# Patient Record
Sex: MALE | Race: WHITE | URBAN - METROPOLITAN AREA
[De-identification: names, ages, dates, MRNs, and addresses within clinical notes are randomized per-mention and may not be internally consistent; named-entity substitution may affect disease eponyms.]

---

## 2020-10-05 ENCOUNTER — TELEPHONE ENCOUNTER (OUTPATIENT)
Dept: URBAN - METROPOLITAN AREA CLINIC 82 | Facility: CLINIC | Age: 75
End: 2020-10-05

## 2020-12-08 ENCOUNTER — TELEPHONE ENCOUNTER (OUTPATIENT)
Dept: URBAN - METROPOLITAN AREA CLINIC 82 | Facility: CLINIC | Age: 75
End: 2020-12-08

## 2021-04-14 ENCOUNTER — LAB OUTSIDE AN ENCOUNTER (OUTPATIENT)
Dept: URBAN - METROPOLITAN AREA CLINIC 82 | Facility: CLINIC | Age: 76
End: 2021-04-14

## 2021-04-14 ENCOUNTER — OFFICE VISIT (OUTPATIENT)
Dept: URBAN - METROPOLITAN AREA CLINIC 82 | Facility: CLINIC | Age: 76
End: 2021-04-14
Payer: MEDICARE

## 2021-04-14 DIAGNOSIS — K22.70 BARRETT'S ESOPHAGUS: ICD-10-CM

## 2021-04-14 DIAGNOSIS — K58.9 IRRITABLE BOWEL SYNDROME: ICD-10-CM

## 2021-04-14 DIAGNOSIS — Z86.010 PERSONAL HISTORY OF COLON POLYPS: ICD-10-CM

## 2021-04-14 DIAGNOSIS — K21.9 GERD: ICD-10-CM

## 2021-04-14 PROCEDURE — G9903 PT SCRN TBCO ID AS NON USER: HCPCS | Performed by: INTERNAL MEDICINE

## 2021-04-14 PROCEDURE — G8420 CALC BMI NORM PARAMETERS: HCPCS | Performed by: INTERNAL MEDICINE

## 2021-04-14 PROCEDURE — G8427 DOCREV CUR MEDS BY ELIG CLIN: HCPCS | Performed by: INTERNAL MEDICINE

## 2021-04-14 PROCEDURE — 99214 OFFICE O/P EST MOD 30 MIN: CPT | Performed by: INTERNAL MEDICINE

## 2021-04-14 RX ORDER — ASPIRIN 81 MG/1
1 TABLET TABLET, COATED ORAL ONCE A DAY
Status: ACTIVE | COMMUNITY

## 2021-04-14 RX ORDER — ASPIRIN 81 MG/1
TAKE 1 TABLET (81 MG) BY ORAL ROUTE ONCE DAILY TABLET, COATED ORAL 1
Qty: 0 | Refills: 0 | Status: ACTIVE | COMMUNITY
Start: 1900-01-01

## 2021-04-14 RX ORDER — ATENOLOL 25 MG/1
TABLET ORAL
Qty: 0 | Refills: 0 | Status: ACTIVE | COMMUNITY
Start: 1900-01-01

## 2021-04-14 NOTE — HPI-TODAY'S VISIT:
04/14/2021 Patient presents for a follow up office visit. 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 Colonoscopy in 09/2018 showed polyps in the rectum. Patient c/o intermittent bloating, but now he can pass the gas unlike before. He has stopped eating late at night, and his bowel movements are normal. Changing his diet and eating earlier has greatly improved his symptoms. His heartburn has improved. His stomach stometimes feels "uneasy" but he takes a Tums when that happens. He still takes omeprazole twice a day but has not used dicyclomine for a while. He does not notice much difference when he did use dicyclomine. Denies any issues with dysphagia. Patient is scheduled to see Dr. Corona next month for a physcial. His last blood work showed vitamin D and B12 levels were normal.

## 2021-04-14 NOTE — HPI-OTHER HISTORIES
The patient is a 74 year old /White male, who presents for the follow-up evaluation of GERD.  Since the last visit the patient is doing well. There are no new complaints. The present regimen is being followed. The patient has had an EGD. The EGD showed the findings that are listed in the data base of the chart. I reviewed these with the patient. and Michele's esophagus. The biopsies showed no dysplasia. 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 He also underwent recently ablation of Michele's esophagus. No dysplasia noted durong EGD. he came for post ablation of Michele's esophagus follow up. Symptoms of bloating and dyspepsia improved. Denies of dysphagia . no blaoting, no GI bleeding. he feel better, no dysophagia. no nauea. no GI bleeding 7/17/16 says he hurt his back recenly. no heartburn , no gerd. no dysaphgia. no loss of weight.     12/13/2016 Patient complains of some heartburn. Denies acid reflux. He stopped taking Bentyl because he felt as if it didn't help him. Patient is currently on Omeprazole therapy bid. He takes vitamin D and B12. He admits occasional bloating. Last EGD on 12/14/15 looked okay. No GI bleeding. No dysphagia.  07/20/2017 Labwork done by Dr. Corona on 12/13/16 were normal. EGD done on 06/20/17 showed gastritis and small tongue like projection suggestive of Michele's esophagus on the biopsies without dysplasia. Small umbilical hernia noted. Patient says he is doing better. Bloating improved. He is currently on Omeprazole. He has been following up with a nephrologist.  1/18/2018 Patient denies heartburn and acid reflux. Denies dysphagia and bloating. TUMS sometimes provides him relief. Still taking omeprazole. Denies constipation and abdominal pain. Previous endoscopy did show Michele's esophagus and single tongue-like projection.  1/11/19 Denies any new issues with hemorrhoids. Patient had an episode with sickness where he had an ear infection and frequent ear popping, after going to specialist, he was placed on Furoxime. States he does not feel he has complete evacuation when he has bowel movement. Denies constipation. Admits to occasional bloating. Denies rectal bleeding from hemorrhoids, never had surgery done.  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 Biopsy of the esophagus showed focal Michele's esophagus with no dysplasia. Patient states he still has occasional bloating, but he admits to changing his diet by adding more vegetables and some fruits. He has also lost weight. He has started using [employer] colon health probiotics but he did not see any imporvement. He is still taking Omeprazole. He states he had sinus surgery which made a difference, since he can now breath through his nose. He had a lot of drainage, which is resolving now after the surgery. He states Bentyl did not help his bloating symptoms. He denies any melena or hematochezia.  10/18/2019 Patient states symptoms are better. He states he has been on omeprazole. Denies any dysphagia. He will see Dr. Corona this upcoming Tuesday. He has not taken Levsin, he states he took it for a while and did not notice any improvement. He has changed his diet. Still has occasional bloating. He also has been on probiotic therapy. Overall, symptoms are under control. Denies any abdominal pain, melena or hematochezia. Colonoscopy on 9/21/2018 showed polyps.  04/08/2020 I called the patient as he was unable to join for video call on [employer]. He attempted to download [employer] and it was not supported, proceeded with telephone visit. He states bloating has improved, but he does report upset stomach, normally after taking the omeprazole. He reports making changes to his diet. He states the discomfort, is not every day. Denies any dysphagia. He states he feels pretty good overall. He has not had any symptoms of heartburn. He states that during his morning bowel movement, he feels incomplete evacuation. He has to have another bowel movement after a few hours.

## 2021-04-16 ENCOUNTER — TELEPHONE ENCOUNTER (OUTPATIENT)
Dept: URBAN - METROPOLITAN AREA CLINIC 82 | Facility: CLINIC | Age: 76
End: 2021-04-16

## 2021-04-16 RX ORDER — OMEPRAZOLE 40 MG/1
1 CAPSULE 30 MINUTES BEFORE MORNING MEAL CAPSULE, DELAYED RELEASE PELLETS ORAL TWICE A DAY
Qty: 180 | Refills: 2

## 2021-04-19 ENCOUNTER — OFFICE VISIT (OUTPATIENT)
Dept: URBAN - METROPOLITAN AREA SURGERY CENTER 13 | Facility: SURGERY CENTER | Age: 76
End: 2021-04-19
Payer: MEDICARE

## 2021-04-19 ENCOUNTER — CLAIMS CREATED FROM THE CLAIM WINDOW (OUTPATIENT)
Dept: URBAN - METROPOLITAN AREA CLINIC 4 | Facility: CLINIC | Age: 76
End: 2021-04-19
Payer: MEDICARE

## 2021-04-19 DIAGNOSIS — R10.13 ABDOMINAL DISCOMFORT, EPIGASTRIC: ICD-10-CM

## 2021-04-19 DIAGNOSIS — K31.89 MASS OF DUODENUM: ICD-10-CM

## 2021-04-19 DIAGNOSIS — K21.9 GASTRO-ESOPHAGEAL REFLUX DISEASE WITHOUT ESOPHAGITIS: ICD-10-CM

## 2021-04-19 DIAGNOSIS — K31.89 ACQUIRED DEFORMITY OF DUODENUM: ICD-10-CM

## 2021-04-19 DIAGNOSIS — K22.70 BARRETT ESOPHAGUS: ICD-10-CM

## 2021-04-19 DIAGNOSIS — K21.9 ACID REFLUX: ICD-10-CM

## 2021-04-19 PROCEDURE — 88312 SPECIAL STAINS GROUP 1: CPT | Performed by: PATHOLOGY

## 2021-04-19 PROCEDURE — 43239 EGD BIOPSY SINGLE/MULTIPLE: CPT | Performed by: INTERNAL MEDICINE

## 2021-04-19 PROCEDURE — G8907 PT DOC NO EVENTS ON DISCHARG: HCPCS | Performed by: INTERNAL MEDICINE

## 2021-04-19 PROCEDURE — 88305 TISSUE EXAM BY PATHOLOGIST: CPT | Performed by: PATHOLOGY

## 2021-05-04 ENCOUNTER — OFFICE VISIT (OUTPATIENT)
Dept: URBAN - METROPOLITAN AREA CLINIC 82 | Facility: CLINIC | Age: 76
End: 2021-05-04
Payer: MEDICARE

## 2021-05-04 DIAGNOSIS — Z86.010 PERSONAL HISTORY OF COLON POLYPS: ICD-10-CM

## 2021-05-04 DIAGNOSIS — K58.9 IRRITABLE BOWEL SYNDROME: ICD-10-CM

## 2021-05-04 DIAGNOSIS — K22.70 BARRETT'S ESOPHAGUS: ICD-10-CM

## 2021-05-04 DIAGNOSIS — K21.9 GERD: ICD-10-CM

## 2021-05-04 PROCEDURE — G8420 CALC BMI NORM PARAMETERS: HCPCS | Performed by: INTERNAL MEDICINE

## 2021-05-04 PROCEDURE — G8427 DOCREV CUR MEDS BY ELIG CLIN: HCPCS | Performed by: INTERNAL MEDICINE

## 2021-05-04 PROCEDURE — G9903 PT SCRN TBCO ID AS NON USER: HCPCS | Performed by: INTERNAL MEDICINE

## 2021-05-04 PROCEDURE — 99214 OFFICE O/P EST MOD 30 MIN: CPT | Performed by: INTERNAL MEDICINE

## 2021-05-04 RX ORDER — ASPIRIN 81 MG/1
1 TABLET TABLET, COATED ORAL ONCE A DAY
Status: ACTIVE | COMMUNITY

## 2021-05-04 RX ORDER — ATENOLOL 25 MG/1
TABLET ORAL
Qty: 0 | Refills: 0 | Status: ACTIVE | COMMUNITY
Start: 1900-01-01

## 2021-05-04 RX ORDER — OMEPRAZOLE 40 MG/1
1 CAPSULE 30 MINUTES BEFORE MORNING MEAL CAPSULE, DELAYED RELEASE PELLETS ORAL TWICE A DAY
Qty: 180 | Refills: 2 | Status: ACTIVE | COMMUNITY

## 2021-05-04 RX ORDER — FAMOTIDINE 40 MG/1
1 TABLET TABLET, FILM COATED ORAL TWICE A DAY
Qty: 180 TABLET | OUTPATIENT
Start: 2021-05-04

## 2021-05-04 RX ORDER — ASPIRIN 81 MG/1
TAKE 1 TABLET (81 MG) BY ORAL ROUTE ONCE DAILY TABLET, COATED ORAL 1
Qty: 0 | Refills: 0 | Status: ACTIVE | COMMUNITY
Start: 1900-01-01

## 2021-05-04 NOTE — HPI-TODAY'S VISIT:
04/14/2021 Patient presents for a follow up office visit. 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 Colonoscopy in 09/2018 showed polyps in the rectum. Patient c/o intermittent bloating, but now he can pass the gas unlike before. He has stopped eating late at night, and his bowel movements are normal. Changing his diet and eating earlier has greatly improved his symptoms. His heartburn has improved. His stomach stometimes feels "uneasy" but he takes a Tums when that happens. He still takes omeprazole twice a day but has not used dicyclomine for a while. He does not notice much difference when he did use dicyclomine. Denies any issues with dysphagia. Patient is scheduled to see Dr. Corona next month for a physical. His last blood work showed vitamin D and B12 levels were normal.  05/04/2021 Patient presents for a follow up office visit. EGD on 04/19/2021 showed esophagitis and gastritis. Biopsy did not reveal any Michele's esophagus. Patient c/o occasional bloating and stomach pains. He used dicyclomine in the past without much effect. He denies heartburn or indigestion, mostly just "uneasy" stomach. He is still taking omeprazole twice a day. He does c/o occasional nocturnal symptoms, so he takes a Tums or Rolaid to soothe his stomach. Overall, he has been feeling well. He notes some hypertension which is being followed by his PCP Dr. Corona.

## 2021-05-04 NOTE — HPI-OTHER HISTORIES
The patient is a 74 year old /White male, who presents for the follow-up evaluation of GERD.  Since the last visit the patient is doing well. There are no new complaints. The present regimen is being followed. The patient has had an EGD. The EGD showed the findings that are listed in the data base of the chart. I reviewed these with the patient. and Michele's esophagus. The biopsies showed no dysplasia. 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 by Dr Gruber. He also underwent recently ablation of Michele's esophagus. No dysplasia noted durong EGD. he came for post ablation of Michele's esophagus follow up. Symptoms of bloating and dyspepsia improved. Denies of dysphagia . no blaoting, no GI bleeding. he feel better, no dysophagia. no nauea. no GI bleeding 7/17/16 says he hurt his back recenly. no heartburn , no gerd. no dysaphgia. no loss of weight.     12/13/2016 Patient complains of some heartburn. Denies acid reflux. He stopped taking Bentyl because he felt as if it didn't help him. Patient is currently on Omeprazole therapy bid. He takes vitamin D and B12. He admits occasional bloating. Last EGD on 12/14/15 looked okay. No GI bleeding. No dysphagia.  07/20/2017 Labwork done by Dr. Corona on 12/13/16 were normal. EGD done on 06/20/17 showed gastritis and small tongue like projection suggestive of Michele's esophagus on the biopsies without dysplasia. Small umbilical hernia noted. Patient says he is doing better. Bloating improved. He is currently on Omeprazole. He has been following up with a nephrologist.  1/18/2018 Patient denies heartburn and acid reflux. Denies dysphagia and bloating. TUMS sometimes provides him relief. Still taking omeprazole. Denies constipation and abdominal pain. Previous endoscopy did show Michele's esophagus and single tongue-like projection.  1/11/19 Denies any new issues with hemorrhoids. Patient had an episode with sickness where he had an ear infection and frequent ear popping, after going to specialist, he was placed on Furoxime. States he does not feel he has complete evacuation when he has bowel movement. Denies constipation. Admits to occasional bloating. Denies rectal bleeding from hemorrhoids, never had surgery done.  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 Biopsy of the esophagus showed focal Michele's esophagus with no dysplasia. Patient states he still has occasional bloating, but he admits to changing his diet by adding more vegetables and some fruits. He has also lost weight. He has started using [employer] colon health probiotics but he did not see any imporvement. He is still taking Omeprazole. He states he had sinus surgery which made a difference, since he can now breath through his nose. He had a lot of drainage, which is resolving now after the surgery. He states Bentyl did not help his bloating symptoms. He denies any melena or hematochezia.  10/18/2019 Patient states symptoms are better. He states he has been on omeprazole. Denies any dysphagia. He will see Dr. Corona this upcoming Tuesday. He has not taken Levsin, he states he took it for a while and did not notice any improvement. He has changed his diet. Still has occasional bloating. He also has been on probiotic therapy. Overall, symptoms are under control. Denies any abdominal pain, melena or hematochezia. Colonoscopy on 9/21/2018 showed polyps.  04/08/2020 I called the patient as he was unable to join for video call on [employer]. He attempted to download [employer] and it was not supported, proceeded with telephone visit. He states bloating has improved, but he does report upset stomach, normally after taking the omeprazole. He reports making changes to his diet. He states the discomfort, is not every day. Denies any dysphagia. He states he feels pretty good overall. He has not had any symptoms of heartburn. He states that during his morning bowel movement, he feels incomplete evacuation. He has to have another bowel movement after a few hours.

## 2021-05-05 ENCOUNTER — TELEPHONE ENCOUNTER (OUTPATIENT)
Dept: URBAN - METROPOLITAN AREA CLINIC 82 | Facility: CLINIC | Age: 76
End: 2021-05-05

## 2021-05-10 ENCOUNTER — TELEPHONE ENCOUNTER (OUTPATIENT)
Dept: URBAN - METROPOLITAN AREA CLINIC 82 | Facility: CLINIC | Age: 76
End: 2021-05-10

## 2021-11-02 ENCOUNTER — OFFICE VISIT (OUTPATIENT)
Dept: URBAN - METROPOLITAN AREA CLINIC 82 | Facility: CLINIC | Age: 76
End: 2021-11-02
Payer: MEDICARE

## 2021-11-02 DIAGNOSIS — Z86.010 PERSONAL HISTORY OF COLON POLYPS: ICD-10-CM

## 2021-11-02 DIAGNOSIS — K21.9 GERD: ICD-10-CM

## 2021-11-02 DIAGNOSIS — K58.9 IRRITABLE BOWEL SYNDROME: ICD-10-CM

## 2021-11-02 DIAGNOSIS — K22.70 BARRETT'S ESOPHAGUS: ICD-10-CM

## 2021-11-02 PROCEDURE — G9903 PT SCRN TBCO ID AS NON USER: HCPCS | Performed by: INTERNAL MEDICINE

## 2021-11-02 PROCEDURE — G8420 CALC BMI NORM PARAMETERS: HCPCS | Performed by: INTERNAL MEDICINE

## 2021-11-02 PROCEDURE — 99214 OFFICE O/P EST MOD 30 MIN: CPT | Performed by: INTERNAL MEDICINE

## 2021-11-02 PROCEDURE — G8427 DOCREV CUR MEDS BY ELIG CLIN: HCPCS | Performed by: INTERNAL MEDICINE

## 2021-11-02 RX ORDER — ASPIRIN 81 MG/1
TAKE 1 TABLET (81 MG) BY ORAL ROUTE ONCE DAILY TABLET, COATED ORAL 1
Qty: 0 | Refills: 0 | Status: DISCONTINUED | COMMUNITY
Start: 1900-01-01

## 2021-11-02 RX ORDER — OMEPRAZOLE 40 MG/1
1 CAPSULE 30 MINUTES BEFORE MORNING MEAL CAPSULE, DELAYED RELEASE PELLETS ORAL TWICE A DAY
Qty: 180 | Refills: 2 | Status: ACTIVE | COMMUNITY

## 2021-11-02 RX ORDER — FAMOTIDINE 40 MG/1
1 TABLET TABLET, FILM COATED ORAL TWICE A DAY
Qty: 180 TABLET | Status: ACTIVE | COMMUNITY
Start: 2021-05-04

## 2021-11-02 RX ORDER — ATENOLOL 25 MG/1
TABLET ORAL
Qty: 0 | Refills: 0 | Status: ACTIVE | COMMUNITY
Start: 1900-01-01

## 2021-11-02 RX ORDER — ASPIRIN 81 MG/1
1 TABLET TABLET, COATED ORAL ONCE A DAY
Status: ACTIVE | COMMUNITY

## 2021-11-02 NOTE — HPI-OTHER HISTORIES
The patient is a 74 year old /White male, who presents for the follow-up evaluation of GERD.  Since the last visit the patient is doing well. There are no new complaints. The present regimen is being followed. The patient has had an EGD. The EGD showed the findings that are listed in the data base of the chart. I reviewed these with the patient. and Michele's esophagus. The biopsies showed no dysplasia. 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 He also underwent recently ablation of Michele's esophagus. No dysplasia noted durong EGD. he came for post ablation of Michele's esophagus follow up. Symptoms of bloating and dyspepsia improved. Denies of dysphagia . no blaoting, no GI bleeding. he feel better, no dysophagia. no nauea. no GI bleeding 7/17/16 says he hurt his back recenly. no heartburn , no gerd. no dysaphgia. no loss of weight.     12/13/2016 Patient complains of some heartburn. Denies acid reflux. He stopped taking Bentyl because he felt as if it didn't help him. Patient is currently on Omeprazole therapy bid. He takes vitamin D and B12. He admits occasional bloating. Last EGD on 12/14/15 looked okay. No GI bleeding. No dysphagia.  07/20/2017 Labwork done by Dr. Corona on 12/13/16 were normal. EGD done on 06/20/17 showed gastritis and small tongue like projection suggestive of Michele's esophagus on the biopsies without dysplasia. Small umbilical hernia noted. Patient says he is doing better. Bloating improved. He is currently on Omeprazole. He has been following up with a nephrologist.  1/18/2018 Patient denies heartburn and acid reflux. Denies dysphagia and bloating. TUMS sometimes provides him relief. Still taking omeprazole. Denies constipation and abdominal pain. Previous endoscopy did show Michele's esophagus and single tongue-like projection.  1/11/19 Denies any new issues with hemorrhoids. Patient had an episode with sickness where he had an ear infection and frequent ear popping, after going to specialist, he was placed on Furoxime. States he does not feel he has complete evacuation when he has bowel movement. Denies constipation. Admits to occasional bloating. Denies rectal bleeding from hemorrhoids, never had surgery done.  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 Biopsy of the esophagus showed focal Michele's esophagus with no dysplasia. Patient states he still has occasional bloating, but he admits to changing his diet by adding more vegetables and some fruits. He has also lost weight. He has started using [employer] colon health probiotics but he did not see any imporvement. He is still taking Omeprazole. He states he had sinus surgery which made a difference, since he can now breath through his nose. He had a lot of drainage, which is resolving now after the surgery. He states Bentyl did not help his bloating symptoms. He denies any melena or hematochezia.  10/18/2019 Patient states symptoms are better. He states he has been on omeprazole. Denies any dysphagia. He will see Dr. Corona this upcoming Tuesday. He has not taken Levsin, he states he took it for a while and did not notice any improvement. He has changed his diet. Still has occasional bloating. He also has been on probiotic therapy. Overall, symptoms are under control. Denies any abdominal pain, melena or hematochezia. Colonoscopy on 9/21/2018 showed polyps.  04/08/2020 I called the patient as he was unable to join for video call on [employer]. He attempted to download [employer] and it was not supported, proceeded with telephone visit. He states bloating has improved, but he does report upset stomach, normally after taking the omeprazole. He reports making changes to his diet. He states the discomfort, is not every day. Denies any dysphagia. He states he feels pretty good overall. He has not had any symptoms of heartburn. He states that during his morning bowel movement, he feels incomplete evacuation. He has to have another bowel movement after a few hours.
Warm/Dry

## 2021-11-02 NOTE — HPI-TODAY'S VISIT:
04/14/2021 Patient presents for a follow up office visit. 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 Colonoscopy in 09/2018 showed polyps in the rectum. Patient c/o intermittent bloating, but now he can pass the gas unlike before. He has stopped eating late at night, and his bowel movements are normal. Changing his diet and eating earlier has greatly improved his symptoms. His heartburn has improved. His stomach stometimes feels "uneasy" but he takes a Tums when that happens. He still takes omeprazole twice a day but has not used dicyclomine for a while. He does not notice much difference when he did use dicyclomine. Denies any issues with dysphagia. Patient is scheduled to see Dr. Corona next month for a physical. His last blood work showed vitamin D and B12 levels were normal.  05/04/2021 Patient presents for a follow up office visit. EGD on 04/19/2021 showed esophagitis and gastritis. Biopsy did not reveal any Michele's esophagus. Patient c/o occasional bloating and stomach pains. He used dicyclomine in the past without much effect. He denies heartburn or indigestion, mostly just "uneasy" stomach. He is still taking omeprazole twice a day. He does c/o occasional nocturnal symptoms, so he takes a Tums or Rolaid to soothe his stomach. Overall, he has been feeling well. He notes some hypertension which is being followed by his PCP Dr. Corona.   11/2/2021 Patient presents for a follow up office visit. EGD on 4/19/2021 showed esophagitis and gastritis. No Michele's esoophagus and no H pylori noted on biopsy. Patient reports a recurrence of mild heartburn after switching from Omeprazole to Famotidine in the evenings. He uses Tums to manage nocturnal symptoms. He denies any bloating or gas pains. Denies any dysphagia. He began drinking Pepsi Freeze drinks which help him pass gas. His bowel movements are overall regular. Symptoms have also improved with weight loss. He denies any changes to medical history. Patient takes vitamin B12 and D supplements daily.

## 2021-11-17 ENCOUNTER — TELEPHONE ENCOUNTER (OUTPATIENT)
Dept: URBAN - METROPOLITAN AREA CLINIC 82 | Facility: CLINIC | Age: 76
End: 2021-11-17

## 2021-11-17 RX ORDER — FAMOTIDINE 40 MG/1
1 TABLET TABLET, FILM COATED ORAL TWICE A DAY
Qty: 180 TABLET | Refills: 1 | OUTPATIENT
Start: 2021-11-17

## 2022-02-16 ENCOUNTER — OFFICE VISIT (OUTPATIENT)
Dept: URBAN - METROPOLITAN AREA CLINIC 82 | Facility: CLINIC | Age: 77
End: 2022-02-16

## 2022-02-16 RX ORDER — ASPIRIN 81 MG/1
1 TABLET TABLET, COATED ORAL ONCE A DAY
Status: ACTIVE | COMMUNITY

## 2022-02-16 RX ORDER — FAMOTIDINE 40 MG/1
1 TABLET TABLET, FILM COATED ORAL TWICE A DAY
Qty: 180 TABLET | Refills: 1 | Status: ACTIVE | COMMUNITY
Start: 2021-11-17

## 2022-02-16 RX ORDER — ATENOLOL 25 MG/1
TABLET ORAL
Qty: 0 | Refills: 0 | Status: ACTIVE | COMMUNITY
Start: 1900-01-01

## 2022-02-16 RX ORDER — OMEPRAZOLE 40 MG/1
1 CAPSULE 30 MINUTES BEFORE MORNING MEAL CAPSULE, DELAYED RELEASE PELLETS ORAL TWICE A DAY
Qty: 180 | Refills: 2 | Status: ACTIVE | COMMUNITY

## 2022-02-16 RX ORDER — FAMOTIDINE 40 MG/1
1 TABLET TABLET, FILM COATED ORAL TWICE A DAY
Qty: 180 TABLET | Status: ACTIVE | COMMUNITY
Start: 2021-05-04

## 2022-02-18 ENCOUNTER — LAB OUTSIDE AN ENCOUNTER (OUTPATIENT)
Dept: URBAN - METROPOLITAN AREA CLINIC 82 | Facility: CLINIC | Age: 77
End: 2022-02-18

## 2022-02-18 ENCOUNTER — OFFICE VISIT (OUTPATIENT)
Dept: URBAN - METROPOLITAN AREA CLINIC 82 | Facility: CLINIC | Age: 77
End: 2022-02-18
Payer: MEDICARE

## 2022-02-18 DIAGNOSIS — K58.9 IRRITABLE BOWEL SYNDROME: ICD-10-CM

## 2022-02-18 DIAGNOSIS — Z86.010 PERSONAL HISTORY OF COLON POLYPS: ICD-10-CM

## 2022-02-18 DIAGNOSIS — K21.9 GERD: ICD-10-CM

## 2022-02-18 DIAGNOSIS — K22.70 BARRETT'S ESOPHAGUS: ICD-10-CM

## 2022-02-18 PROCEDURE — G8427 DOCREV CUR MEDS BY ELIG CLIN: HCPCS | Performed by: INTERNAL MEDICINE

## 2022-02-18 PROCEDURE — G8420 CALC BMI NORM PARAMETERS: HCPCS | Performed by: INTERNAL MEDICINE

## 2022-02-18 PROCEDURE — G9903 PT SCRN TBCO ID AS NON USER: HCPCS | Performed by: INTERNAL MEDICINE

## 2022-02-18 PROCEDURE — 99214 OFFICE O/P EST MOD 30 MIN: CPT | Performed by: INTERNAL MEDICINE

## 2022-02-18 RX ORDER — FAMOTIDINE 40 MG/1
1 TABLET TABLET, FILM COATED ORAL TWICE A DAY
Qty: 180 TABLET | Refills: 1 | Status: ACTIVE | COMMUNITY
Start: 2021-11-17

## 2022-02-18 RX ORDER — ASPIRIN 81 MG/1
1 TABLET TABLET, COATED ORAL ONCE A DAY
Status: ACTIVE | COMMUNITY

## 2022-02-18 RX ORDER — HYOSCYAMINE SULFATE 0.12 MG/1
1 TABLET AS NEEDED TABLET ORAL THREE TIMES A DAY
Qty: 270 TABLET | Refills: 1 | OUTPATIENT
Start: 2022-02-18 | End: 2022-08-17

## 2022-02-18 RX ORDER — ATENOLOL 25 MG/1
TABLET ORAL
Qty: 0 | Refills: 0 | Status: ACTIVE | COMMUNITY
Start: 1900-01-01

## 2022-02-18 RX ORDER — OMEPRAZOLE 40 MG/1
1 CAPSULE 30 MINUTES BEFORE MORNING MEAL CAPSULE, DELAYED RELEASE PELLETS ORAL TWICE A DAY
Qty: 180 | Refills: 2 | Status: ACTIVE | COMMUNITY

## 2022-02-18 RX ORDER — FAMOTIDINE 40 MG/1
1 TABLET TABLET, FILM COATED ORAL TWICE A DAY
Qty: 180 TABLET | Status: ACTIVE | COMMUNITY
Start: 2021-05-04

## 2022-02-18 NOTE — HPI-TODAY'S VISIT:
04/14/2021 Patient presents for a follow up office visit. 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 Colonoscopy in 09/2018 showed polyps in the rectum. Patient c/o intermittent bloating, but now he can pass the gas unlike before. He has stopped eating late at night, and his bowel movements are normal. Changing his diet and eating earlier has greatly improved his symptoms. His heartburn has improved. His stomach stometimes feels "uneasy" but he takes a Tums when that happens. He still takes omeprazole twice a day but has not used dicyclomine for a while. He does not notice much difference when he did use dicyclomine. Denies any issues with dysphagia. Patient is scheduled to see Dr. Corona next month for a physical. His last blood work showed vitamin D and B12 levels were normal.  05/04/2021 Patient presents for a follow up office visit. EGD on 04/19/2021 showed esophagitis and gastritis. Biopsy did not reveal any Michele's esophagus. Patient c/o occasional bloating and stomach pains. He used dicyclomine in the past without much effect. He denies heartburn or indigestion, mostly just "uneasy" stomach. He is still taking omeprazole twice a day. He does c/o occasional nocturnal symptoms, so he takes a Tums or Rolaid to soothe his stomach. Overall, he has been feeling well. He notes some hypertension which is being followed by his PCP Dr. Corona.   11/2/2021 Patient presents for a follow up office visit. EGD on 4/19/2021 showed esophagitis and gastritis. No Michele's esoophagus and no H pylori noted on biopsy. Patient reports a recurrence of mild heartburn after switching from Omeprazole to Famotidine in the evenings. He uses Tums to manage nocturnal symptoms. He denies any bloating or gas pains. Denies any dysphagia. He began drinking Pepsi Freeze drinks which help him pass gas. His bowel movements are overall regular. Symptoms have also improved with weight loss. He denies any changes to medical history. Patient takes vitamin B12 and D supplements daily.    02/18/2022 EGD on 4/19/2021 showed eosphagitis and gastritis. Biopsy did not reveal any Michele's esophagus. Colonoscopy in 2018 showed internal hemorrhoids. Labs from Dr. Gentry Corona on 2/3/2022 showed slightly high triglycerides, otherwise normal. US on 2/10/2022 showed fatty liver. Patient c/o upset stomach, bloating, and intermittent burning in his chest. He tried Dicyclomine in the past without much improvement. He takes Omeprazole 40mg every morning, Pepcid in the evening, and Tums occasionally. Bowel movements are overall normal. Patient admits experiencing some stress recently.

## 2022-02-18 NOTE — HPI-OTHER HISTORIES
The patient is a 74 year old /White male, who presents for the follow-up evaluation of GERD.  Since the last visit the patient is doing well. There are no new complaints. The present regimen is being followed. The patient has had an EGD. The EGD showed the findings that are listed in the data base of the chart. I reviewed these with the patient. and Michele's esophagus. The biopsies showed no dysplasia. 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 He also underwent recently ablation of Michele's esophagus. No dysplasia noted durong EGD. he came for post ablation of Michele's esophagus follow up. Symptoms of bloating and dyspepsia improved. Denies of dysphagia . no blaoting, no GI bleeding. he feel better, no dysophagia. no nauea. no GI bleeding 7/17/16 says he hurt his back recenly. no heartburn , no gerd. no dysaphgia. no loss of weight.     12/13/2016 Patient complains of some heartburn. Denies acid reflux. He stopped taking Bentyl because he felt as if it didn't help him. Patient is currently on Omeprazole therapy bid. He takes vitamin D and B12. He admits occasional bloating. Last EGD on 12/14/15 looked okay. No GI bleeding. No dysphagia.  07/20/2017 Labwork done by Dr. Corona on 12/13/16 were normal. EGD done on 06/20/17 showed gastritis and small tongue like projection suggestive of Michele's esophagus on the biopsies without dysplasia. Small umbilical hernia noted. Patient says he is doing better. Bloating improved. He is currently on Omeprazole. He has been following up with a nephrologist.  1/18/2018 Patient denies heartburn and acid reflux. Denies dysphagia and bloating. TUMS sometimes provides him relief. Still taking omeprazole. Denies constipation and abdominal pain. Previous endoscopy did show Michele's esophagus and single tongue-like projection.  1/11/19 Denies any new issues with hemorrhoids. Patient had an episode with sickness where he had an ear infection and frequent ear popping, after going to specialist, he was placed on Furoxime. States he does not feel he has complete evacuation when he has bowel movement. Denies constipation. Admits to occasional bloating. Denies rectal bleeding from hemorrhoids, never had surgery done.  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 Biopsy of the esophagus showed focal Michele's esophagus with no dysplasia. Patient states he still has occasional bloating, but he admits to changing his diet by adding more vegetables and some fruits. He has also lost weight. He has started using [employer] colon health probiotics but he did not see any imporvement. He is still taking Omeprazole. He states he had sinus surgery which made a difference, since he can now breath through his nose. He had a lot of drainage, which is resolving now after the surgery. He states Bentyl did not help his bloating symptoms. He denies any melena or hematochezia.  10/18/2019 Patient states symptoms are better. He states he has been on omeprazole. Denies any dysphagia. He will see Dr. Corona this upcoming Tuesday. He has not taken Levsin, he states he took it for a while and did not notice any improvement. He has changed his diet. Still has occasional bloating. He also has been on probiotic therapy. Overall, symptoms are under control. Denies any abdominal pain, melena or hematochezia. Colonoscopy on 9/21/2018 showed polyps.  04/08/2020 I called the patient as he was unable to join for video call on [employer]. He attempted to download [employer] and it was not supported, proceeded with telephone visit. He states bloating has improved, but he does report upset stomach, normally after taking the omeprazole. He reports making changes to his diet. He states the discomfort, is not every day. Denies any dysphagia. He states he feels pretty good overall. He has not had any symptoms of heartburn. He states that during his morning bowel movement, he feels incomplete evacuation. He has to have another bowel movement after a few hours.

## 2022-03-14 ENCOUNTER — OFFICE VISIT (OUTPATIENT)
Dept: URBAN - METROPOLITAN AREA SURGERY CENTER 13 | Facility: SURGERY CENTER | Age: 77
End: 2022-03-14

## 2022-03-18 ENCOUNTER — CLAIMS CREATED FROM THE CLAIM WINDOW (OUTPATIENT)
Dept: URBAN - METROPOLITAN AREA CLINIC 4 | Facility: CLINIC | Age: 77
End: 2022-03-18
Payer: MEDICARE

## 2022-03-18 ENCOUNTER — OFFICE VISIT (OUTPATIENT)
Dept: URBAN - METROPOLITAN AREA SURGERY CENTER 13 | Facility: SURGERY CENTER | Age: 77
End: 2022-03-18
Payer: MEDICARE

## 2022-03-18 DIAGNOSIS — K21.9 GASTRO-ESOPHAGEAL REFLUX DISEASE WITHOUT ESOPHAGITIS: ICD-10-CM

## 2022-03-18 DIAGNOSIS — K30 ACID INDIGESTION: ICD-10-CM

## 2022-03-18 DIAGNOSIS — K31.89 FOCAL FOVEOLAR HYPERPLASIA: ICD-10-CM

## 2022-03-18 DIAGNOSIS — K29.70 GASTRITIS, UNSPECIFIED, WITHOUT BLEEDING: ICD-10-CM

## 2022-03-18 DIAGNOSIS — K21.9 ACID REFLUX: ICD-10-CM

## 2022-03-18 DIAGNOSIS — K31.89 ACQUIRED DEFORMITY OF DUODENUM: ICD-10-CM

## 2022-03-18 PROCEDURE — 43239 EGD BIOPSY SINGLE/MULTIPLE: CPT | Performed by: INTERNAL MEDICINE

## 2022-03-18 PROCEDURE — G8907 PT DOC NO EVENTS ON DISCHARG: HCPCS | Performed by: INTERNAL MEDICINE

## 2022-03-18 PROCEDURE — 88305 TISSUE EXAM BY PATHOLOGIST: CPT | Performed by: PATHOLOGY

## 2022-03-18 PROCEDURE — 88312 SPECIAL STAINS GROUP 1: CPT | Performed by: PATHOLOGY

## 2022-04-04 ENCOUNTER — OFFICE VISIT (OUTPATIENT)
Dept: URBAN - METROPOLITAN AREA SURGERY CENTER 13 | Facility: SURGERY CENTER | Age: 77
End: 2022-04-04

## 2022-04-04 ENCOUNTER — TELEPHONE ENCOUNTER (OUTPATIENT)
Dept: URBAN - METROPOLITAN AREA CLINIC 115 | Facility: CLINIC | Age: 77
End: 2022-04-04

## 2022-04-15 ENCOUNTER — OFFICE VISIT (OUTPATIENT)
Dept: URBAN - METROPOLITAN AREA CLINIC 82 | Facility: CLINIC | Age: 77
End: 2022-04-15
Payer: MEDICARE

## 2022-04-15 VITALS
WEIGHT: 194.2 LBS | SYSTOLIC BLOOD PRESSURE: 172 MMHG | BODY MASS INDEX: 27.8 KG/M2 | DIASTOLIC BLOOD PRESSURE: 96 MMHG | HEART RATE: 64 BPM | TEMPERATURE: 97.1 F | HEIGHT: 70 IN

## 2022-04-15 DIAGNOSIS — Z86.010 PERSONAL HISTORY OF COLON POLYPS: ICD-10-CM

## 2022-04-15 DIAGNOSIS — K58.9 IRRITABLE BOWEL SYNDROME: ICD-10-CM

## 2022-04-15 DIAGNOSIS — K22.70 BARRETT'S ESOPHAGUS: ICD-10-CM

## 2022-04-15 DIAGNOSIS — K21.9 GERD: ICD-10-CM

## 2022-04-15 PROCEDURE — G8427 DOCREV CUR MEDS BY ELIG CLIN: HCPCS | Performed by: INTERNAL MEDICINE

## 2022-04-15 PROCEDURE — G9903 PT SCRN TBCO ID AS NON USER: HCPCS | Performed by: INTERNAL MEDICINE

## 2022-04-15 PROCEDURE — G8420 CALC BMI NORM PARAMETERS: HCPCS | Performed by: INTERNAL MEDICINE

## 2022-04-15 PROCEDURE — 99214 OFFICE O/P EST MOD 30 MIN: CPT | Performed by: INTERNAL MEDICINE

## 2022-04-15 RX ORDER — FAMOTIDINE 40 MG/1
1 TABLET TABLET, FILM COATED ORAL TWICE A DAY
Qty: 180 TABLET | Status: ACTIVE | COMMUNITY
Start: 2021-05-04

## 2022-04-15 RX ORDER — ATENOLOL 25 MG/1
TABLET ORAL
Qty: 0 | Refills: 0 | Status: ACTIVE | COMMUNITY
Start: 1900-01-01

## 2022-04-15 RX ORDER — OMEPRAZOLE 40 MG/1
1 CAPSULE 30 MINUTES BEFORE MORNING MEAL CAPSULE, DELAYED RELEASE PELLETS ORAL TWICE A DAY
Qty: 180 | Refills: 2 | Status: ACTIVE | COMMUNITY

## 2022-04-15 RX ORDER — HYOSCYAMINE SULFATE 0.12 MG/1
1 TABLET AS NEEDED TABLET ORAL THREE TIMES A DAY
Qty: 270 TABLET | Refills: 1 | Status: ACTIVE | COMMUNITY
Start: 2022-02-18 | End: 2022-08-17

## 2022-04-15 RX ORDER — ASPIRIN 81 MG/1
1 TABLET TABLET, COATED ORAL ONCE A DAY
Status: ACTIVE | COMMUNITY

## 2022-04-15 RX ORDER — FAMOTIDINE 40 MG/1
1 TABLET TABLET, FILM COATED ORAL TWICE A DAY
Qty: 180 TABLET | Refills: 1 | Status: ACTIVE | COMMUNITY
Start: 2021-11-17

## 2022-04-15 NOTE — HPI-OTHER HISTORIES
The patient is a 74 year old /White male, who presents for the follow-up evaluation of GERD. Since the last visit the patient is doing well. There are no new complaints. The present regimen is being followed. The patient has had an EGD. The EGD showed the findings that are listed in the data base of the chart. I reviewed these with the patient. and Michele's esophagus. The biopsies showed no dysplasia. 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 He also underwent recently ablation of Michele's esophagus. No dysplasia noted durong EGD. he came for post ablation of Michele's esophagus follow up. Symptoms of bloating and dyspepsia improved. Denies of dysphagia . no blaoting, no GI bleeding. he feel better, no dysophagia. no nauea. no GI bleeding 7/17/16 says he hurt his back recenly. no heartburn , no gerd. no dysaphgia. no loss of weight.     12/13/2016 Patient complains of some heartburn. Denies acid reflux. He stopped taking Bentyl because he felt as if it didn't help him. Patient is currently on Omeprazole therapy bid. He takes vitamin D and B12. He admits occasional bloating. Last EGD on 12/14/15 looked okay. No GI bleeding. No dysphagia.  07/20/2017 Labwork done by Dr. Corona on 12/13/16 were normal. EGD done on 06/20/17 showed gastritis and small tongue like projection suggestive of Michele's esophagus on the biopsies without dysplasia. Small umbilical hernia noted. Patient says he is doing better. Bloating improved. He is currently on Omeprazole. He has been following up with a nephrologist.  1/18/2018 Patient denies heartburn and acid reflux. Denies dysphagia and bloating. TUMS sometimes provides him relief. Still taking omeprazole. Denies constipation and abdominal pain. Previous endoscopy did show Michele's esophagus and single tongue-like projection.  1/11/19 Denies any new issues with hemorrhoids. Patient had an episode with sickness where he had an ear infection and frequent ear popping, after going to specialist, he was placed on Furoxime. States he does not feel he has complete evacuation when he has bowel movement. Denies constipation. Admits to occasional bloating. Denies rectal bleeding from hemorrhoids, never had surgery done.  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 Biopsy of the esophagus showed focal Michele's esophagus with no dysplasia. Patient states he still has occasional bloating, but he admits to changing his diet by adding more vegetables and some fruits. He has also lost weight. He has started using [employer] colon health probiotics but he did not see any imporvement. He is still taking Omeprazole. He states he had sinus surgery which made a difference, since he can now breath through his nose. He had a lot of drainage, which is resolving now after the surgery. He states Bentyl did not help his bloating symptoms. He denies any melena or hematochezia.  10/18/2019 Patient states symptoms are better. He states he has been on omeprazole. Denies any dysphagia. He will see Dr. Corona this upcoming Tuesday. He has not taken Levsin, he states he took it for a while and did not notice any improvement. He has changed his diet. Still has occasional bloating. He also has been on probiotic therapy. Overall, symptoms are under control. Denies any abdominal pain, melena or hematochezia. Colonoscopy on 9/21/2018 showed polyps.  04/08/2020 I called the patient as he was unable to join for video call on [employer]. He attempted to download [employer] and it was not supported, proceeded with telephone visit. He states bloating has improved, but he does report upset stomach, normally after taking the omeprazole. He reports making changes to his diet. He states the discomfort, is not every day. Denies any dysphagia. He states he feels pretty good overall. He has not had any symptoms of heartburn. He states that during his morning bowel movement, he feels incomplete evacuation. He has to have another bowel movement after a few hours.

## 2022-04-15 NOTE — HPI-TODAY'S VISIT:
04/14/2021 Patient presents for a follow up office visit. 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 Colonoscopy in 09/2018 showed polyps in the rectum. Patient c/o intermittent bloating, but now he can pass the gas unlike before. He has stopped eating late at night, and his bowel movements are normal. Changing his diet and eating earlier has greatly improved his symptoms. His heartburn has improved. His stomach stometimes feels "uneasy" but he takes a Tums when that happens. He still takes omeprazole twice a day but has not used dicyclomine for a while. He does not notice much difference when he did use dicyclomine. Denies any issues with dysphagia. Patient is scheduled to see Dr. Corona next month for a physical. His last blood work showed vitamin D and B12 levels were normal.  05/04/2021 Patient presents for a follow up office visit. EGD on 04/19/2021 showed esophagitis and gastritis. Biopsy did not reveal any Michele's esophagus. Patient c/o occasional bloating and stomach pains. He used dicyclomine in the past without much effect. He denies heartburn or indigestion, mostly just "uneasy" stomach. He is still taking omeprazole twice a day. He does c/o occasional nocturnal symptoms, so he takes a Tums or Rolaid to soothe his stomach. Overall, he has been feeling well. He notes some hypertension which is being followed by his PCP Dr. Corona.   11/2/2021 Patient presents for a follow up office visit. EGD on 4/19/2021 showed esophagitis and gastritis. No Michele's esoophagus and no H pylori noted on biopsy. Patient reports a recurrence of mild heartburn after switching from Omeprazole to Famotidine in the evenings. He uses Tums to manage nocturnal symptoms. He denies any bloating or gas pains. Denies any dysphagia. He began drinking Pepsi Freeze drinks which help him pass gas. His bowel movements are overall regular. Symptoms have also improved with weight loss. He denies any changes to medical history. Patient takes vitamin B12 and D supplements daily.    02/18/2022 EGD on 4/19/2021 showed eosphagitis and gastritis. Biopsy did not reveal any Michele's esophagus. Colonoscopy in 2018 showed internal hemorrhoids. Labs from Dr. Gentry Corona on 2/3/2022 showed slightly high triglycerides, otherwise normal. US on 2/10/2022 showed fatty liver. Patient c/o upset stomach, bloating, and intermittent burning in his chest. He tried Dicyclomine in the past without much improvement. He takes Omeprazole 40mg every morning, Pepcid in the evening, and Tums occasionally. Bowel movements are overall normal. Patient admits experiencing some stress recently.   4/15/2022 EGD on 3/18/2022 showed gastritis and esophagitis. Biopsy showed no H pylori, no Michele's esophagus. Patient reports heartburn and bloating are mostly under control on Omeprazole in the morning, Pepcid in the evening, and Dicyclomine tid. Only occasional breakthrough symptoms.

## 2022-04-25 ENCOUNTER — ERX REFILL RESPONSE (OUTPATIENT)
Dept: URBAN - METROPOLITAN AREA CLINIC 82 | Facility: CLINIC | Age: 77
End: 2022-04-25

## 2022-04-25 RX ORDER — OMEPRAZOLE 40 MG/1
TAKE ONE CAPSULE BY MOUTH TWICE DAILY CAPSULE, DELAYED RELEASE ORAL
Qty: 180 CAPSULE | Refills: 3 | OUTPATIENT

## 2022-04-25 RX ORDER — OMEPRAZOLE 40 MG/1
1 CAPSULE 30 MINUTES BEFORE MORNING MEAL CAPSULE, DELAYED RELEASE PELLETS ORAL TWICE A DAY
Qty: 180 | Refills: 2 | OUTPATIENT

## 2022-04-26 ENCOUNTER — TELEPHONE ENCOUNTER (OUTPATIENT)
Dept: URBAN - METROPOLITAN AREA CLINIC 82 | Facility: CLINIC | Age: 77
End: 2022-04-26

## 2022-04-26 RX ORDER — OMEPRAZOLE 40 MG/1
TAKE ONE CAPSULE BY MOUTH TWICE DAILY CAPSULE, DELAYED RELEASE ORAL
Qty: 180 CAPSULE | Refills: 0

## 2022-05-10 ENCOUNTER — OFFICE VISIT (OUTPATIENT)
Dept: URBAN - METROPOLITAN AREA CLINIC 82 | Facility: CLINIC | Age: 77
End: 2022-05-10

## 2022-05-18 ENCOUNTER — OFFICE VISIT (OUTPATIENT)
Dept: URBAN - METROPOLITAN AREA CLINIC 82 | Facility: CLINIC | Age: 77
End: 2022-05-18

## 2022-05-18 ENCOUNTER — OFFICE VISIT (OUTPATIENT)
Dept: URBAN - METROPOLITAN AREA CLINIC 82 | Facility: CLINIC | Age: 77
End: 2022-05-18
Payer: MEDICARE

## 2022-05-18 DIAGNOSIS — Z86.010 PERSONAL HISTORY OF COLON POLYPS: ICD-10-CM

## 2022-05-18 DIAGNOSIS — K22.70 BARRETT'S ESOPHAGUS: ICD-10-CM

## 2022-05-18 DIAGNOSIS — K58.9 IRRITABLE BOWEL SYNDROME: ICD-10-CM

## 2022-05-18 DIAGNOSIS — K21.9 GERD: ICD-10-CM

## 2022-05-18 PROCEDURE — G8427 DOCREV CUR MEDS BY ELIG CLIN: HCPCS | Performed by: INTERNAL MEDICINE

## 2022-05-18 PROCEDURE — 99214 OFFICE O/P EST MOD 30 MIN: CPT | Performed by: INTERNAL MEDICINE

## 2022-05-18 PROCEDURE — G9903 PT SCRN TBCO ID AS NON USER: HCPCS | Performed by: INTERNAL MEDICINE

## 2022-05-18 PROCEDURE — G8420 CALC BMI NORM PARAMETERS: HCPCS | Performed by: INTERNAL MEDICINE

## 2022-05-18 RX ORDER — OMEPRAZOLE 40 MG/1
TAKE ONE CAPSULE BY MOUTH TWICE DAILY CAPSULE, DELAYED RELEASE ORAL
Qty: 180 CAPSULE | Refills: 0 | Status: ACTIVE | COMMUNITY

## 2022-05-18 RX ORDER — HYOSCYAMINE SULFATE 0.12 MG/1
1 TABLET AS NEEDED TABLET ORAL THREE TIMES A DAY
Qty: 270 TABLET | Refills: 1 | Status: ACTIVE | COMMUNITY
Start: 2022-02-18 | End: 2022-08-17

## 2022-05-18 RX ORDER — FAMOTIDINE 40 MG/1
1 TABLET TABLET, FILM COATED ORAL TWICE A DAY
Qty: 180 TABLET | Refills: 1 | Status: ACTIVE | COMMUNITY
Start: 2021-11-17

## 2022-05-18 RX ORDER — ASPIRIN 81 MG/1
1 TABLET TABLET, COATED ORAL ONCE A DAY
Status: ACTIVE | COMMUNITY

## 2022-05-18 RX ORDER — ATENOLOL 25 MG/1
TABLET ORAL
Qty: 0 | Refills: 0 | Status: ACTIVE | COMMUNITY
Start: 1900-01-01

## 2022-05-18 RX ORDER — FAMOTIDINE 40 MG/1
1 TABLET TABLET, FILM COATED ORAL TWICE A DAY
Qty: 180 TABLET | Status: ACTIVE | COMMUNITY
Start: 2021-05-04

## 2022-05-18 RX ORDER — ESOMEPRAZOLE MAGNESIUM 20 MG/1
1 CAPSULE CAPSULE, DELAYED RELEASE ORAL TWICE A DAY
Qty: 180 CAPSULE | Refills: 1 | OUTPATIENT
Start: 2022-05-18

## 2022-05-18 NOTE — HPI-OTHER HISTORIES
The patient is a 74 year old /White male, who presents for the follow-up evaluation of GERD. Since the last visit the patient is doing well. There are no new complaints. The present regimen is being followed. The patient has had an EGD. The EGD showed the findings that are listed in the data base of the chart. I reviewed these with the patient. and Michele's esophagus. The biopsies showed no dysplasia. 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 He also underwent recently ablation of Michele's esophagus. No dysplasia noted durong EGD. he came for post ablation of Michele's esophagus follow up. Symptoms of bloating and dyspepsia improved. Denies of dysphagia . no blaoting, no GI bleeding. he feel better, no dysophagia. no nauea. no GI bleeding 7/17/16 says he hurt his back recenly. no heartburn , no gerd. no dysaphgia. no loss of weight.     12/13/2016 Patient complains of some heartburn. Denies acid reflux. He stopped taking Bentyl because he felt as if it didn't help him. Patient is currently on Omeprazole therapy bid. He takes vitamin D and B12. He admits occasional bloating. Last EGD on 12/14/15 looked okay. No GI bleeding. No dysphagia.  07/20/2017 Labwork done by Dr. Corona on 12/13/16 were normal. EGD done on 06/20/17 showed gastritis and small tongue like projection suggestive of Michele's esophagus on the biopsies without dysplasia. Small umbilical hernia noted. Patient says he is doing better. Bloating improved. He is currently on Omeprazole. He has been following up with a nephrologist.  1/18/2018 Patient denies heartburn and acid reflux. Denies dysphagia and bloating. TUMS sometimes provides him relief. Still taking omeprazole. Denies constipation and abdominal pain. Previous endoscopy did show Michele's esophagus and single tongue-like projection.  1/11/19 Denies any new issues with hemorrhoids. Patient had an episode with sickness where he had an ear infection and frequent ear popping, after going to specialist, he was placed on Furoxime. States he does not feel he has complete evacuation when he has bowel movement. Denies constipation. Admits to occasional bloating. Denies rectal bleeding from hemorrhoids, never had surgery done.  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 Biopsy of the esophagus showed focal Michele's esophagus with no dysplasia. Patient states he still has occasional bloating, but he admits to changing his diet by adding more vegetables and some fruits. He has also lost weight. He has started using [employer] colon health probiotics but he did not see any imporvement. He is still taking Omeprazole. He states he had sinus surgery which made a difference, since he can now breath through his nose. He had a lot of drainage, which is resolving now after the surgery. He states Bentyl did not help his bloating symptoms. He denies any melena or hematochezia.  10/18/2019 Patient states symptoms are better. He states he has been on omeprazole. Denies any dysphagia. He will see Dr. Corona this upcoming Tuesday. He has not taken Levsin, he states he took it for a while and did not notice any improvement. He has changed his diet. Still has occasional bloating. He also has been on probiotic therapy. Overall, symptoms are under control. Denies any abdominal pain, melena or hematochezia. Colonoscopy on 9/21/2018 showed polyps.  04/08/2020 I called the patient as he was unable to join for video call on [employer]. He attempted to download [employer] and it was not supported, proceeded with telephone visit. He states bloating has improved, but he does report upset stomach, normally after taking the omeprazole. He reports making changes to his diet. He states the discomfort, is not every day. Denies any dysphagia. He states he feels pretty good overall. He has not had any symptoms of heartburn. He states that during his morning bowel movement, he feels incomplete evacuation. He has to have another bowel movement after a few hours.

## 2022-05-18 NOTE — HPI-TODAY'S VISIT:
04/14/2021 Patient presents for a follow up office visit. 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 Colonoscopy in 09/2018 showed polyps in the rectum. Patient c/o intermittent bloating, but now he can pass the gas unlike before. He has stopped eating late at night, and his bowel movements are normal. Changing his diet and eating earlier has greatly improved his symptoms. His heartburn has improved. His stomach stometimes feels "uneasy" but he takes a Tums when that happens. He still takes omeprazole twice a day but has not used dicyclomine for a while. He does not notice much difference when he did use dicyclomine. Denies any issues with dysphagia. Patient is scheduled to see Dr. Corona next month for a physical. His last blood work showed vitamin D and B12 levels were normal.  05/04/2021 Patient presents for a follow up office visit. EGD on 04/19/2021 showed esophagitis and gastritis. Biopsy did not reveal any Michele's esophagus. Patient c/o occasional bloating and stomach pains. He used dicyclomine in the past without much effect. He denies heartburn or indigestion, mostly just "uneasy" stomach. He is still taking omeprazole twice a day. He does c/o occasional nocturnal symptoms, so he takes a Tums or Rolaid to soothe his stomach. Overall, he has been feeling well. He notes some hypertension which is being followed by his PCP Dr. Corona.   11/2/2021 Patient presents for a follow up office visit. EGD on 4/19/2021 showed esophagitis and gastritis. No Michele's esoophagus and no H pylori noted on biopsy. Patient reports a recurrence of mild heartburn after switching from Omeprazole to Famotidine in the evenings. He uses Tums to manage nocturnal symptoms. He denies any bloating or gas pains. Denies any dysphagia. He began drinking Pepsi Freeze drinks which help him pass gas. His bowel movements are overall regular. Symptoms have also improved with weight loss. He denies any changes to medical history. Patient takes vitamin B12 and D supplements daily.    02/18/2022 EGD on 4/19/2021 showed eosphagitis and gastritis. Biopsy did not reveal any Michele's esophagus. Colonoscopy in 2018 showed internal hemorrhoids. Labs from Dr. Gentry Corona on 2/3/2022 showed slightly high triglycerides, otherwise normal. US on 2/10/2022 showed fatty liver. Patient c/o upset stomach, bloating, and intermittent burning in his chest. He tried Dicyclomine in the past without much improvement. He takes Omeprazole 40mg every morning, Pepcid in the evening, and Tums occasionally. Bowel movements are overall normal. Patient admits experiencing some stress recently.   4/15/2022 EGD on 3/18/2022 showed gastritis and esophagitis. Biopsy showed no H pylori, no Michele's esophagus. Patient reports heartburn and bloating are mostly under control on Omeprazole in the morning, Pepcid in the evening, and Dicyclomine tid. Only occasional breakthrough symptoms.  5/18/2022 Patient presents for a follow up office visit. EGD on 3/18/2022 showed short segment Michele's esophagus and gastritis. Biopsy were negative for Michele's esophagus. Patient presents today with complaints of intermittent heartburn, mostly in the evenings. He is taking Levsin 0.125mg tid, Omeprazole 40mg every morning, and Pepcid in the evening. Gas and bloating have improved. He does not notice much difference to IBS on Levsin. He denies any recent changes to diet. He admits increased exercise recently.

## 2022-06-02 ENCOUNTER — TELEPHONE ENCOUNTER (OUTPATIENT)
Dept: URBAN - METROPOLITAN AREA CLINIC 82 | Facility: CLINIC | Age: 77
End: 2022-06-02

## 2022-06-08 ENCOUNTER — TELEPHONE ENCOUNTER (OUTPATIENT)
Dept: URBAN - METROPOLITAN AREA CLINIC 82 | Facility: CLINIC | Age: 77
End: 2022-06-08

## 2022-06-16 ENCOUNTER — TELEPHONE ENCOUNTER (OUTPATIENT)
Dept: URBAN - METROPOLITAN AREA CLINIC 82 | Facility: CLINIC | Age: 77
End: 2022-06-16

## 2022-06-21 ENCOUNTER — OFFICE VISIT (OUTPATIENT)
Dept: URBAN - METROPOLITAN AREA CLINIC 82 | Facility: CLINIC | Age: 77
End: 2022-06-21
Payer: MEDICARE

## 2022-06-21 VITALS
HEART RATE: 64 BPM | HEIGHT: 70 IN | DIASTOLIC BLOOD PRESSURE: 77 MMHG | TEMPERATURE: 98.1 F | SYSTOLIC BLOOD PRESSURE: 147 MMHG | BODY MASS INDEX: 26.77 KG/M2 | WEIGHT: 187 LBS

## 2022-06-21 DIAGNOSIS — Z86.010 PERSONAL HISTORY OF COLON POLYPS: ICD-10-CM

## 2022-06-21 DIAGNOSIS — K22.70 BARRETT'S ESOPHAGUS: ICD-10-CM

## 2022-06-21 DIAGNOSIS — K21.9 GERD: ICD-10-CM

## 2022-06-21 DIAGNOSIS — K58.9 IRRITABLE BOWEL SYNDROME: ICD-10-CM

## 2022-06-21 PROCEDURE — 99214 OFFICE O/P EST MOD 30 MIN: CPT | Performed by: INTERNAL MEDICINE

## 2022-06-21 PROCEDURE — G9903 PT SCRN TBCO ID AS NON USER: HCPCS | Performed by: INTERNAL MEDICINE

## 2022-06-21 PROCEDURE — G8420 CALC BMI NORM PARAMETERS: HCPCS | Performed by: INTERNAL MEDICINE

## 2022-06-21 PROCEDURE — G8427 DOCREV CUR MEDS BY ELIG CLIN: HCPCS | Performed by: INTERNAL MEDICINE

## 2022-06-21 RX ORDER — ESOMEPRAZOLE MAGNESIUM 20 MG/1
1 CAPSULE CAPSULE, DELAYED RELEASE ORAL TWICE A DAY
Qty: 180 CAPSULE | Refills: 1 | OUTPATIENT

## 2022-06-21 RX ORDER — ATENOLOL 25 MG/1
TABLET ORAL
Qty: 0 | Refills: 0 | Status: ACTIVE | COMMUNITY
Start: 1900-01-01

## 2022-06-21 RX ORDER — CHOLESTYRAMINE 4 G/9G
1 PACKET MIXED WITH WATER OR NON-CARBONATED DRINK POWDER, FOR SUSPENSION ORAL TWICE A DAY
Qty: 60 | Refills: 1 | OUTPATIENT
Start: 2022-06-21

## 2022-06-21 RX ORDER — FAMOTIDINE 40 MG/1
1 TABLET TABLET, FILM COATED ORAL TWICE A DAY
Qty: 180 TABLET | Refills: 1 | Status: ACTIVE | COMMUNITY
Start: 2021-11-17

## 2022-06-21 RX ORDER — HYOSCYAMINE SULFATE 0.12 MG/1
1 TABLET AS NEEDED TABLET ORAL THREE TIMES A DAY
Qty: 270 TABLET | Refills: 1 | Status: ACTIVE | COMMUNITY
Start: 2022-02-18 | End: 2022-08-17

## 2022-06-21 RX ORDER — OMEPRAZOLE 40 MG/1
TAKE ONE CAPSULE BY MOUTH TWICE DAILY CAPSULE, DELAYED RELEASE ORAL
Qty: 180 CAPSULE | Refills: 0 | Status: ACTIVE | COMMUNITY

## 2022-06-21 RX ORDER — FAMOTIDINE 40 MG/1
1 TABLET TABLET, FILM COATED ORAL TWICE A DAY
Qty: 180 TABLET | Status: ACTIVE | COMMUNITY
Start: 2021-05-04

## 2022-06-21 RX ORDER — ASPIRIN 81 MG/1
1 TABLET TABLET, COATED ORAL ONCE A DAY
Status: ACTIVE | COMMUNITY

## 2022-06-21 RX ORDER — ESOMEPRAZOLE MAGNESIUM 20 MG/1
1 CAPSULE CAPSULE, DELAYED RELEASE ORAL TWICE A DAY
Qty: 180 CAPSULE | Refills: 1 | Status: ACTIVE | COMMUNITY
Start: 2022-05-18

## 2022-06-21 NOTE — HPI-TODAY'S VISIT:
04/14/2021 Patient presents for a follow up office visit. 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 Colonoscopy in 09/2018 showed polyps in the rectum. Patient c/o intermittent bloating, but now he can pass the gas unlike before. He has stopped eating late at night, and his bowel movements are normal. Changing his diet and eating earlier has greatly improved his symptoms. His heartburn has improved. His stomach stometimes feels "uneasy" but he takes a Tums when that happens. He still takes omeprazole twice a day but has not used dicyclomine for a while. He does not notice much difference when he did use dicyclomine. Denies any issues with dysphagia. Patient is scheduled to see Dr. Corona next month for a physical. His last blood work showed vitamin D and B12 levels were normal.  05/04/2021 Patient presents for a follow up office visit. EGD on 04/19/2021 showed esophagitis and gastritis. Biopsy did not reveal any Michele's esophagus. Patient c/o occasional bloating and stomach pains. He used dicyclomine in the past without much effect. He denies heartburn or indigestion, mostly just "uneasy" stomach. He is still taking omeprazole twice a day. He does c/o occasional nocturnal symptoms, so he takes a Tums or Rolaid to soothe his stomach. Overall, he has been feeling well. He notes some hypertension which is being followed by his PCP Dr. Corona.   11/2/2021 Patient presents for a follow up office visit. EGD on 4/19/2021 showed esophagitis and gastritis. No Michele's esoophagus and no H pylori noted on biopsy. Patient reports a recurrence of mild heartburn after switching from Omeprazole to Famotidine in the evenings. He uses Tums to manage nocturnal symptoms. He denies any bloating or gas pains. Denies any dysphagia. He began drinking Pepsi Freeze drinks which help him pass gas. His bowel movements are overall regular. Symptoms have also improved with weight loss. He denies any changes to medical history. Patient takes vitamin B12 and D supplements daily.    02/18/2022 EGD on 4/19/2021 showed eosphagitis and gastritis. Biopsy did not reveal any Michele's esophagus. Colonoscopy in 2018 showed internal hemorrhoids. Labs from Dr. Gentry Corona on 2/3/2022 showed slightly high triglycerides, otherwise normal. US on 2/10/2022 showed fatty liver. Patient c/o upset stomach, bloating, and intermittent burning in his chest. He tried Dicyclomine in the past without much improvement. He takes Omeprazole 40mg every morning, Pepcid in the evening, and Tums occasionally. Bowel movements are overall normal. Patient admits experiencing some stress recently.   4/15/2022 EGD on 3/18/2022 showed gastritis and esophagitis. Biopsy showed no H pylori, no Michele's esophagus. Patient reports heartburn and bloating are mostly under control on Omeprazole in the morning, Pepcid in the evening, and Dicyclomine tid. Only occasional breakthrough symptoms.  5/18/2022 Patient presents for a follow up office visit. EGD on 3/18/2022 showed short segment Michele's esophagus and gastritis. Biopsy were negative for Michele's esophagus. Patient presents today with complaints of intermittent heartburn, mostly in the evenings. He is taking Levsin 0.125mg tid, Omeprazole 40mg every morning, and Pepcid in the evening. Gas and bloating have improved. He does not notice much difference to IBS on Levsin. He denies any recent changes to diet. He admits increased exercise recently.  06/21/2022 Patient presents with diarrhea. Patient states that he has been having episodes of diarrhea with some blood. He states that bloating is there a little but not as much as before. Patient states that he has been taking align but not IBgard. Patient's last colonoscopy was done in 2018 which showed 3 polyps. He states that he stopped drinking milk and that he is still taking dicyclomine.

## 2022-06-21 NOTE — HPI-OTHER HISTORIES
The patient is a 74 year old /White male, who presents for the follow-up evaluation of GERD. Since the last visit the patient is doing well. There are no new complaints. The present regimen is being followed. The patient has had an EGD. The EGD showed the findings that are listed in the data base of the chart. I reviewed these with the patient. and Michele's esophagus. The biopsies showed no dysplasia. 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 He also underwent recently ablation of Michele's esophagus. No dysplasia noted durong EGD. he came for post ablation of Michele's esophagus follow up. Symptoms of bloating and dyspepsia improved. Denies of dysphagia . no blaoting, no GI bleeding. he feel better, no dysophagia. no nauea. no GI bleeding 7/17/16 says he hurt his back recenly. no heartburn , no gerd. no dysaphgia. no loss of weight.     12/13/2016 Patient complains of some heartburn. Denies acid reflux. He stopped taking Bentyl because he felt as if it didn't help him. Patient is currently on Omeprazole therapy bid. He takes vitamin D and B12. He admits occasional bloating. Last EGD on 12/14/15 looked okay. No GI bleeding. No dysphagia.  07/20/2017 Labwork done by Dr. Corona on 12/13/16 were normal. EGD done on 06/20/17 showed gastritis and small tongue like projection suggestive of Michele's esophagus on the biopsies without dysplasia. Small umbilical hernia noted. Patient says he is doing better. Bloating improved. He is currently on Omeprazole. He has been following up with a nephrologist.  1/18/2018 Patient denies heartburn and acid reflux. Denies dysphagia and bloating. TUMS sometimes provides him relief. Still taking omeprazole. Denies constipation and abdominal pain. Previous endoscopy did show Michele's esophagus and single tongue-like projection.  1/11/19 Denies any new issues with hemorrhoids. Patient had an episode with sickness where he had an ear infection and frequent ear popping, after going to specialist, he was placed on Furoxime. States he does not feel he has complete evacuation when he has bowel movement. Denies constipation. Admits to occasional bloating. Denies rectal bleeding from hemorrhoids, never had surgery done.  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 Biopsy of the esophagus showed focal Michele's esophagus with no dysplasia. Patient states he still has occasional bloating, but he admits to changing his diet by adding more vegetables and some fruits. He has also lost weight. He has started using [employer] colon health probiotics but he did not see any imporvement. He is still taking Omeprazole. He states he had sinus surgery which made a difference, since he can now breath through his nose. He had a lot of drainage, which is resolving now after the surgery. He states Bentyl did not help his bloating symptoms. He denies any melena or hematochezia.  10/18/2019 Patient states symptoms are better. He states he has been on omeprazole. Denies any dysphagia. He will see Dr. Corona this upcoming Tuesday. He has not taken Levsin, he states he took it for a while and did not notice any improvement. He has changed his diet. Still has occasional bloating. He also has been on probiotic therapy. Overall, symptoms are under control. Denies any abdominal pain, melena or hematochezia. Colonoscopy on 9/21/2018 showed polyps.  04/08/2020 I called the patient as he was unable to join for video call on [employer]. He attempted to download [employer] and it was not supported, proceeded with telephone visit. He states bloating has improved, but he does report upset stomach, normally after taking the omeprazole. He reports making changes to his diet. He states the discomfort, is not every day. Denies any dysphagia. He states he feels pretty good overall. He has not had any symptoms of heartburn. He states that during his morning bowel movement, he feels incomplete evacuation. He has to have another bowel movement after a few hours. Patient states he has diarrhea about 3 times a day everyday.

## 2022-08-01 ENCOUNTER — TELEPHONE ENCOUNTER (OUTPATIENT)
Dept: URBAN - METROPOLITAN AREA CLINIC 82 | Facility: CLINIC | Age: 77
End: 2022-08-01

## 2022-08-03 ENCOUNTER — OFFICE VISIT (OUTPATIENT)
Dept: URBAN - METROPOLITAN AREA CLINIC 82 | Facility: CLINIC | Age: 77
End: 2022-08-03

## 2022-09-16 ENCOUNTER — OFFICE VISIT (OUTPATIENT)
Dept: URBAN - METROPOLITAN AREA CLINIC 82 | Facility: CLINIC | Age: 77
End: 2022-09-16
Payer: MEDICARE

## 2022-09-16 VITALS
WEIGHT: 188.2 LBS | HEART RATE: 61 BPM | BODY MASS INDEX: 26.94 KG/M2 | RESPIRATION RATE: 16 BRPM | DIASTOLIC BLOOD PRESSURE: 76 MMHG | SYSTOLIC BLOOD PRESSURE: 167 MMHG | HEIGHT: 70 IN | TEMPERATURE: 98 F

## 2022-09-16 DIAGNOSIS — K22.70 BARRETT'S ESOPHAGUS: ICD-10-CM

## 2022-09-16 DIAGNOSIS — Z86.010 PERSONAL HISTORY OF COLON POLYPS: ICD-10-CM

## 2022-09-16 DIAGNOSIS — K21.9 GERD: ICD-10-CM

## 2022-09-16 DIAGNOSIS — K58.9 IRRITABLE BOWEL SYNDROME: ICD-10-CM

## 2022-09-16 PROCEDURE — G8427 DOCREV CUR MEDS BY ELIG CLIN: HCPCS | Performed by: INTERNAL MEDICINE

## 2022-09-16 PROCEDURE — G8417 CALC BMI ABV UP PARAM F/U: HCPCS | Performed by: INTERNAL MEDICINE

## 2022-09-16 PROCEDURE — 99214 OFFICE O/P EST MOD 30 MIN: CPT | Performed by: INTERNAL MEDICINE

## 2022-09-16 PROCEDURE — G9902 PT SCRN TBCO AND ID AS USER: HCPCS | Performed by: INTERNAL MEDICINE

## 2022-09-16 RX ORDER — ATENOLOL 25 MG/1
TABLET ORAL
Qty: 0 | Refills: 0 | Status: ACTIVE | COMMUNITY
Start: 1900-01-01

## 2022-09-16 RX ORDER — CHOLESTYRAMINE 4 G/9G
1 PACKET MIXED WITH WATER OR NON-CARBONATED DRINK POWDER, FOR SUSPENSION ORAL TWICE A DAY
Qty: 60 | Refills: 1 | Status: ACTIVE | COMMUNITY
Start: 2022-06-21

## 2022-09-16 RX ORDER — FAMOTIDINE 40 MG/1
1 TABLET TABLET, FILM COATED ORAL TWICE A DAY
Qty: 180 TABLET | Refills: 1 | Status: ACTIVE | COMMUNITY
Start: 2021-11-17

## 2022-09-16 RX ORDER — ASPIRIN 81 MG/1
1 TABLET TABLET, COATED ORAL ONCE A DAY
Status: ACTIVE | COMMUNITY

## 2022-09-16 RX ORDER — ESOMEPRAZOLE MAGNESIUM 20 MG/1
1 CAPSULE CAPSULE, DELAYED RELEASE ORAL TWICE A DAY
Qty: 180 CAPSULE | Refills: 1 | Status: ACTIVE | COMMUNITY

## 2022-09-16 RX ORDER — FAMOTIDINE 40 MG/1
1 TABLET TABLET, FILM COATED ORAL TWICE A DAY
Qty: 180 TABLET | Status: ACTIVE | COMMUNITY
Start: 2021-05-04

## 2022-09-16 RX ORDER — OMEPRAZOLE 40 MG/1
TAKE ONE CAPSULE BY MOUTH TWICE DAILY CAPSULE, DELAYED RELEASE ORAL
Qty: 180 CAPSULE | Refills: 0 | Status: ACTIVE | COMMUNITY

## 2022-09-16 RX ORDER — OMEPRAZOLE 40 MG/1
1 CAPSULE 30 MINUTES BEFORE MORNING MEAL CAPSULE, DELAYED RELEASE ORAL TWICE A DAY
Qty: 180 | Refills: 1 | OUTPATIENT
Start: 2022-09-16

## 2022-09-16 NOTE — HPI-TODAY'S VISIT:
Patient came for follow-up patient says feeling much better now bloating improved abdominal pain improved he did lost weight and he is eating better now symptoms of heartburn is also under control he takes omeprazole twice a day.  He also takes a vitamin B12 supplementation denies of any melena hematochezia denies of any NSAID use denies does complain of occasional bloating occasional excessive bowel sounds he does hear.  Takes probiotics.  No loss of weight no loss of appetite no dysphagia no history of anemia

## 2022-09-30 ENCOUNTER — OFFICE VISIT (OUTPATIENT)
Dept: URBAN - METROPOLITAN AREA CLINIC 82 | Facility: CLINIC | Age: 77
End: 2022-09-30

## 2022-10-19 ENCOUNTER — OFFICE VISIT (OUTPATIENT)
Dept: URBAN - METROPOLITAN AREA CLINIC 82 | Facility: CLINIC | Age: 77
End: 2022-10-19

## 2023-03-15 ENCOUNTER — LAB OUTSIDE AN ENCOUNTER (OUTPATIENT)
Dept: URBAN - METROPOLITAN AREA CLINIC 82 | Facility: CLINIC | Age: 78
End: 2023-03-15

## 2023-03-15 ENCOUNTER — OFFICE VISIT (OUTPATIENT)
Dept: URBAN - METROPOLITAN AREA CLINIC 82 | Facility: CLINIC | Age: 78
End: 2023-03-15
Payer: MEDICARE

## 2023-03-15 VITALS
RESPIRATION RATE: 16 BRPM | HEIGHT: 70 IN | BODY MASS INDEX: 28.49 KG/M2 | WEIGHT: 199 LBS | TEMPERATURE: 97.1 F | SYSTOLIC BLOOD PRESSURE: 149 MMHG | HEART RATE: 61 BPM | DIASTOLIC BLOOD PRESSURE: 77 MMHG

## 2023-03-15 DIAGNOSIS — Z86.010 PERSONAL HISTORY OF COLON POLYPS: ICD-10-CM

## 2023-03-15 DIAGNOSIS — K22.70 BARRETT'S ESOPHAGUS: ICD-10-CM

## 2023-03-15 DIAGNOSIS — K58.9 IRRITABLE BOWEL SYNDROME: ICD-10-CM

## 2023-03-15 DIAGNOSIS — K21.9 GERD: ICD-10-CM

## 2023-03-15 PROCEDURE — G8427 DOCREV CUR MEDS BY ELIG CLIN: HCPCS | Performed by: INTERNAL MEDICINE

## 2023-03-15 PROCEDURE — G9902 PT SCRN TBCO AND ID AS USER: HCPCS | Performed by: INTERNAL MEDICINE

## 2023-03-15 PROCEDURE — 99214 OFFICE O/P EST MOD 30 MIN: CPT | Performed by: INTERNAL MEDICINE

## 2023-03-15 PROCEDURE — G8420 CALC BMI NORM PARAMETERS: HCPCS | Performed by: INTERNAL MEDICINE

## 2023-03-15 RX ORDER — OMEPRAZOLE 40 MG/1
1 CAPSULE 30 MINUTES BEFORE MORNING MEAL CAPSULE, DELAYED RELEASE ORAL TWICE A DAY
Qty: 180 | Refills: 1 | Status: ON HOLD | COMMUNITY
Start: 2022-09-16

## 2023-03-15 RX ORDER — FAMOTIDINE 40 MG/1
1 TABLET TABLET, FILM COATED ORAL TWICE A DAY
Qty: 180 TABLET | Status: ON HOLD | COMMUNITY
Start: 2021-05-04

## 2023-03-15 RX ORDER — CHOLESTYRAMINE 4 G/9G
1 PACKET MIXED WITH WATER OR NON-CARBONATED DRINK POWDER, FOR SUSPENSION ORAL TWICE A DAY
Qty: 60 | Refills: 1 | Status: ACTIVE | COMMUNITY
Start: 2022-06-21

## 2023-03-15 RX ORDER — FAMOTIDINE 40 MG/1
1 TABLET TABLET, FILM COATED ORAL TWICE A DAY
Qty: 180 TABLET | Refills: 1 | Status: ACTIVE | COMMUNITY
Start: 2021-11-17

## 2023-03-15 RX ORDER — OMEPRAZOLE 40 MG/1
TAKE ONE CAPSULE BY MOUTH TWICE DAILY CAPSULE, DELAYED RELEASE ORAL
Qty: 180 CAPSULE | Refills: 0 | Status: ACTIVE | COMMUNITY

## 2023-03-15 RX ORDER — ATENOLOL 25 MG/1
TABLET ORAL
Qty: 0 | Refills: 0 | Status: ACTIVE | COMMUNITY
Start: 1900-01-01

## 2023-03-15 RX ORDER — ASPIRIN 81 MG/1
1 TABLET TABLET, COATED ORAL ONCE A DAY
Status: ACTIVE | COMMUNITY

## 2023-03-15 RX ORDER — POLYETHYLENE GLYCOL-3350 AND ELECTROLYTES WITH FLAVOR PACK 240; 5.84; 2.98; 6.72; 22.72 G/278.26G; G/278.26G; G/278.26G; G/278.26G; G/278.26G
AS DIRECTED POWDER, FOR SOLUTION ORAL
Qty: 1 | Refills: 0 | OUTPATIENT
Start: 2023-03-15 | End: 2023-03-16

## 2023-03-15 RX ORDER — ESOMEPRAZOLE MAGNESIUM 20 MG/1
1 CAPSULE CAPSULE, DELAYED RELEASE ORAL TWICE A DAY
Qty: 180 CAPSULE | Refills: 1 | Status: ON HOLD | COMMUNITY

## 2023-03-15 NOTE — HPI-TODAY'S VISIT:
Patient came for follow-up patient says feeling much better now bloating improved abdominal pain improved he did lost weight and he is eating better now symptoms of heartburn is also under control he takes omeprazole twice a day.  He also takes a vitamin B12 supplementation denies of any melena hematochezia denies of any NSAID use denies does complain of occasional bloating occasional excessive bowel sounds he does hear.  Takes probiotics.  No loss of weight no loss of appetite no dysphagia no history of anemia.   3/15/2023 Patient presents for a follow up. Patient denies having a lot of acid reflux and he states that he has some bloating, but better since the last time. Patient states that at night, his stomach feels a bit unsettled and that tums helps. Patient denies constipation and states that only sometimes he has loose bowels. Overall, he feels a lot better. He states that he is only on omeprazole. Patient states that protonix and dexilant did not help him in the past.

## 2023-05-08 ENCOUNTER — TELEPHONE ENCOUNTER (OUTPATIENT)
Dept: URBAN - METROPOLITAN AREA CLINIC 82 | Facility: CLINIC | Age: 78
End: 2023-05-08

## 2023-05-08 ENCOUNTER — ERX REFILL RESPONSE (OUTPATIENT)
Dept: URBAN - METROPOLITAN AREA CLINIC 82 | Facility: CLINIC | Age: 78
End: 2023-05-08

## 2023-05-08 RX ORDER — OMEPRAZOLE 40 MG/1
1 CAPSULE 30 MINUTES BEFORE MORNING MEAL CAPSULE, DELAYED RELEASE ORAL TWICE A DAY
Qty: 180 | Refills: 1 | OUTPATIENT

## 2023-05-08 RX ORDER — OMEPRAZOLE 40 MG/1
TAKE ONE CAPSULE BY MOUTH TWICE DAILY CAPSULE, DELAYED RELEASE ORAL
Qty: 180 CAPSULE | Refills: 1 | OUTPATIENT

## 2023-05-08 RX ORDER — OMEPRAZOLE 40 MG/1
TAKE ONE CAPSULE BY MOUTH TWICE DAILY CAPSULE, DELAYED RELEASE ORAL
Qty: 180 CAPSULE | Refills: 3

## 2023-08-30 ENCOUNTER — LAB OUTSIDE AN ENCOUNTER (OUTPATIENT)
Dept: URBAN - METROPOLITAN AREA CLINIC 82 | Facility: CLINIC | Age: 78
End: 2023-08-30

## 2023-08-30 ENCOUNTER — TELEPHONE ENCOUNTER (OUTPATIENT)
Dept: URBAN - METROPOLITAN AREA CLINIC 82 | Facility: CLINIC | Age: 78
End: 2023-08-30

## 2023-08-30 RX ORDER — ESOMEPRAZOLE MAGNESIUM 20 MG/1
1 CAPSULE CAPSULE, DELAYED RELEASE ORAL TWICE A DAY
Qty: 180 CAPSULE | Refills: 1 | Status: ON HOLD | COMMUNITY

## 2023-08-30 RX ORDER — ATENOLOL 25 MG/1
TABLET ORAL
Qty: 0 | Refills: 0 | Status: ACTIVE | COMMUNITY
Start: 1900-01-01

## 2023-08-30 RX ORDER — FAMOTIDINE 40 MG/1
1 TABLET TABLET, FILM COATED ORAL TWICE A DAY
Qty: 180 TABLET | Refills: 1 | Status: ACTIVE | COMMUNITY
Start: 2021-11-17

## 2023-08-30 RX ORDER — OMEPRAZOLE 40 MG/1
TAKE ONE CAPSULE BY MOUTH TWICE DAILY CAPSULE, DELAYED RELEASE ORAL
Qty: 180 CAPSULE | Refills: 1 | Status: ACTIVE | COMMUNITY

## 2023-08-30 RX ORDER — ASPIRIN 81 MG/1
1 TABLET TABLET, COATED ORAL ONCE A DAY
Status: ACTIVE | COMMUNITY

## 2023-08-30 RX ORDER — POLYETHYLENE GLYCOL-3350 AND ELECTROLYTES WITH FLAVOR PACK 240; 5.84; 2.98; 6.72; 22.72 G/278.26G; G/278.26G; G/278.26G; G/278.26G; G/278.26G
AS DIRECTED POWDER, FOR SOLUTION ORAL
Qty: 1 | Refills: 0 | OUTPATIENT
Start: 2023-08-30 | End: 2023-08-31

## 2023-08-30 RX ORDER — FAMOTIDINE 40 MG/1
1 TABLET TABLET, FILM COATED ORAL TWICE A DAY
Qty: 180 TABLET | Status: ON HOLD | COMMUNITY
Start: 2021-05-04

## 2023-08-30 RX ORDER — CHOLESTYRAMINE 4 G/9G
1 PACKET MIXED WITH WATER OR NON-CARBONATED DRINK POWDER, FOR SUSPENSION ORAL TWICE A DAY
Qty: 60 | Refills: 1 | Status: ACTIVE | COMMUNITY
Start: 2022-06-21

## 2023-09-18 ENCOUNTER — CLAIMS CREATED FROM THE CLAIM WINDOW (OUTPATIENT)
Dept: URBAN - METROPOLITAN AREA CLINIC 4 | Facility: CLINIC | Age: 78
End: 2023-09-18
Payer: MEDICARE

## 2023-09-18 ENCOUNTER — OFFICE VISIT (OUTPATIENT)
Dept: URBAN - METROPOLITAN AREA SURGERY CENTER 13 | Facility: SURGERY CENTER | Age: 78
End: 2023-09-18
Payer: MEDICARE

## 2023-09-18 DIAGNOSIS — K22.70 BARRETT'S ESOPHAGUS: ICD-10-CM

## 2023-09-18 DIAGNOSIS — K64.0 FIRST DEGREE HEMORRHOIDS: ICD-10-CM

## 2023-09-18 DIAGNOSIS — K29.00 ACUTE GASTRITIS: ICD-10-CM

## 2023-09-18 DIAGNOSIS — K31.89 OTHER DISEASES OF STOMACH AND DUODENUM: ICD-10-CM

## 2023-09-18 DIAGNOSIS — K57.30 DIVERTCULOSIS OF LG INT W/O PERFORATION OR ABSCESS W/O BLEEDING: ICD-10-CM

## 2023-09-18 DIAGNOSIS — Z09 ENCNTR FOR F/U EXAM AFT TRTMT FOR COND OTH THAN MALIG NEOPLM: ICD-10-CM

## 2023-09-18 DIAGNOSIS — K22.89 OTHER SPECIFIED DISEASE OF ESOPHAGUS: ICD-10-CM

## 2023-09-18 DIAGNOSIS — Z12.11 COLON CANCER SCREENING: ICD-10-CM

## 2023-09-18 PROCEDURE — 43239 EGD BIOPSY SINGLE/MULTIPLE: CPT | Performed by: INTERNAL MEDICINE

## 2023-09-18 PROCEDURE — 88313 SPECIAL STAINS GROUP 2: CPT | Performed by: PATHOLOGY

## 2023-09-18 PROCEDURE — 88305 TISSUE EXAM BY PATHOLOGIST: CPT | Performed by: PATHOLOGY

## 2023-09-18 PROCEDURE — 00813 ANES UPR LWR GI NDSC PX: CPT | Performed by: ANESTHESIOLOGY

## 2023-09-18 PROCEDURE — G0121 COLON CA SCRN NOT HI RSK IND: HCPCS | Performed by: INTERNAL MEDICINE

## 2023-09-18 PROCEDURE — G8907 PT DOC NO EVENTS ON DISCHARG: HCPCS | Performed by: INTERNAL MEDICINE

## 2023-09-18 PROCEDURE — 00813 ANES UPR LWR GI NDSC PX: CPT | Performed by: NURSE ANESTHETIST, CERTIFIED REGISTERED

## 2023-09-18 PROCEDURE — 88312 SPECIAL STAINS GROUP 1: CPT | Performed by: PATHOLOGY

## 2023-09-29 ENCOUNTER — OFFICE VISIT (OUTPATIENT)
Dept: URBAN - METROPOLITAN AREA CLINIC 82 | Facility: CLINIC | Age: 78
End: 2023-09-29

## 2023-09-29 RX ORDER — FAMOTIDINE 40 MG/1
1 TABLET TABLET, FILM COATED ORAL TWICE A DAY
Qty: 180 TABLET | Status: ACTIVE | COMMUNITY
Start: 2021-05-04

## 2023-09-29 RX ORDER — ASPIRIN 81 MG/1
1 TABLET TABLET, COATED ORAL ONCE A DAY
Status: ACTIVE | COMMUNITY

## 2023-09-29 RX ORDER — FAMOTIDINE 40 MG/1
1 TABLET TABLET, FILM COATED ORAL TWICE A DAY
Qty: 180 TABLET | Refills: 1 | Status: ACTIVE | COMMUNITY
Start: 2021-11-17

## 2023-09-29 RX ORDER — CHOLESTYRAMINE 4 G/9G
1 PACKET MIXED WITH WATER OR NON-CARBONATED DRINK POWDER, FOR SUSPENSION ORAL TWICE A DAY
Qty: 60 | Refills: 1 | Status: ACTIVE | COMMUNITY
Start: 2022-06-21

## 2023-09-29 RX ORDER — OMEPRAZOLE 40 MG/1
TAKE ONE CAPSULE BY MOUTH TWICE DAILY CAPSULE, DELAYED RELEASE ORAL
Qty: 180 CAPSULE | Refills: 1 | Status: ACTIVE | COMMUNITY

## 2023-09-29 RX ORDER — ATENOLOL 25 MG/1
TABLET ORAL
Qty: 0 | Refills: 0 | Status: ACTIVE | COMMUNITY
Start: 1900-01-01

## 2023-09-29 RX ORDER — ESOMEPRAZOLE MAGNESIUM 20 MG/1
1 CAPSULE CAPSULE, DELAYED RELEASE ORAL TWICE A DAY
Qty: 180 CAPSULE | Refills: 1 | Status: ACTIVE | COMMUNITY

## 2023-10-30 ENCOUNTER — ERX REFILL RESPONSE (OUTPATIENT)
Dept: URBAN - METROPOLITAN AREA CLINIC 82 | Facility: CLINIC | Age: 78
End: 2023-10-30

## 2023-10-30 RX ORDER — OMEPRAZOLE 40 MG/1
TAKE ONE CAPSULE BY MOUTH TWICE DAILY CAPSULE, DELAYED RELEASE ORAL
Qty: 180 CAPSULE | Refills: 1 | OUTPATIENT

## 2023-11-10 ENCOUNTER — OFFICE VISIT (OUTPATIENT)
Dept: URBAN - METROPOLITAN AREA CLINIC 82 | Facility: CLINIC | Age: 78
End: 2023-11-10
Payer: MEDICARE

## 2023-11-10 VITALS
BODY MASS INDEX: 28.2 KG/M2 | HEART RATE: 65 BPM | TEMPERATURE: 97.7 F | DIASTOLIC BLOOD PRESSURE: 67 MMHG | HEIGHT: 70 IN | SYSTOLIC BLOOD PRESSURE: 114 MMHG | WEIGHT: 197 LBS

## 2023-11-10 DIAGNOSIS — K22.70 BARRETT'S ESOPHAGUS: ICD-10-CM

## 2023-11-10 DIAGNOSIS — K58.8 OTHER IRRITABLE BOWEL SYNDROME: ICD-10-CM

## 2023-11-10 DIAGNOSIS — K21.9 GERD: ICD-10-CM

## 2023-11-10 DIAGNOSIS — Z86.010 PERSONAL HISTORY OF COLON POLYPS: ICD-10-CM

## 2023-11-10 PROBLEM — 428283002 HISTORY OF POLYP OF COLON: Status: ACTIVE | Noted: 2021-04-14

## 2023-11-10 PROCEDURE — 99214 OFFICE O/P EST MOD 30 MIN: CPT | Performed by: INTERNAL MEDICINE

## 2023-11-10 RX ORDER — FAMOTIDINE 40 MG/1
1 TABLET TABLET, FILM COATED ORAL TWICE A DAY
Qty: 180 TABLET | Status: ACTIVE | COMMUNITY
Start: 2021-05-04

## 2023-11-10 RX ORDER — ASPIRIN 81 MG/1
1 TABLET TABLET, COATED ORAL ONCE A DAY
Status: ACTIVE | COMMUNITY

## 2023-11-10 RX ORDER — ESOMEPRAZOLE MAGNESIUM 20 MG/1
1 CAPSULE CAPSULE, DELAYED RELEASE ORAL TWICE A DAY
Qty: 180 CAPSULE | Refills: 1 | Status: ACTIVE | COMMUNITY

## 2023-11-10 RX ORDER — ATENOLOL 25 MG/1
TABLET ORAL
Qty: 0 | Refills: 0 | Status: ACTIVE | COMMUNITY
Start: 1900-01-01

## 2023-11-10 RX ORDER — PANTOPRAZOLE SODIUM 40 MG/1
1 TABLET TABLET, DELAYED RELEASE ORAL ONCE A DAY
Qty: 90 | Refills: 1 | OUTPATIENT
Start: 2023-11-10

## 2023-11-10 RX ORDER — CHOLESTYRAMINE 4 G/9G
1 PACKET MIXED WITH WATER OR NON-CARBONATED DRINK POWDER, FOR SUSPENSION ORAL TWICE A DAY
Qty: 60 | Refills: 1 | Status: ACTIVE | COMMUNITY
Start: 2022-06-21

## 2023-11-10 RX ORDER — FAMOTIDINE 40 MG/1
1 TABLET TABLET, FILM COATED ORAL TWICE A DAY
Qty: 180 TABLET | Refills: 1 | Status: ACTIVE | COMMUNITY
Start: 2021-11-17

## 2023-11-10 RX ORDER — OMEPRAZOLE 40 MG/1
TAKE ONE CAPSULE BY MOUTH TWICE DAILY CAPSULE, DELAYED RELEASE ORAL
Qty: 180 CAPSULE | Refills: 1 | Status: ACTIVE | COMMUNITY

## 2023-11-10 NOTE — HPI-TODAY'S VISIT:
Patient came for follow-up patient says feeling much better now bloating improved abdominal pain improved he did lost weight and he is eating better now symptoms of heartburn is also under control he takes omeprazole twice a day.  He also takes a vitamin B12 supplementation denies of any melena hematochezia denies of any NSAID use denies does complain of occasional bloating occasional excessive bowel sounds he does hear.  Takes probiotics.  No loss of weight no loss of appetite no dysphagia no history of anemia.   3/15/2023 Patient presents for a follow up. Patient denies having a lot of acid reflux and he states that he has some bloating, but better since the last time. Patient states that at night, his stomach feels a bit unsettled and that tums helps. Patient denies constipation and states that only sometimes he has loose bowels. Overall, he feels a lot better. He states that he is only on omeprazole. Patient states that protonix and dexilant did not help him in the past.  11/10/2023 Patient presents for a follow up. Patient denies any new symptoms or issues since the last time he was here. He states that he is still on pantoprazole every day. Overall, patient states that he feels much better.

## 2024-05-10 ENCOUNTER — DASHBOARD ENCOUNTERS (OUTPATIENT)
Age: 79
End: 2024-05-10

## 2024-05-10 ENCOUNTER — OFFICE VISIT (OUTPATIENT)
Dept: URBAN - METROPOLITAN AREA CLINIC 82 | Facility: CLINIC | Age: 79
End: 2024-05-10
Payer: MEDICARE

## 2024-05-10 VITALS
BODY MASS INDEX: 28.67 KG/M2 | SYSTOLIC BLOOD PRESSURE: 129 MMHG | HEIGHT: 70 IN | WEIGHT: 200.3 LBS | TEMPERATURE: 97.3 F | DIASTOLIC BLOOD PRESSURE: 74 MMHG | HEART RATE: 66 BPM

## 2024-05-10 DIAGNOSIS — K21.9 GERD: ICD-10-CM

## 2024-05-10 DIAGNOSIS — K22.70 BARRETT'S ESOPHAGUS: ICD-10-CM

## 2024-05-10 DIAGNOSIS — K58.9 IRRITABLE BOWEL SYNDROME WITHOUT DIARRHEA: ICD-10-CM

## 2024-05-10 DIAGNOSIS — Z86.010 PERSONAL HISTORY OF COLON POLYPS: ICD-10-CM

## 2024-05-10 PROCEDURE — 99214 OFFICE O/P EST MOD 30 MIN: CPT | Performed by: INTERNAL MEDICINE

## 2024-05-10 RX ORDER — FAMOTIDINE 40 MG/1
1 TABLET TABLET, FILM COATED ORAL TWICE A DAY
Qty: 180 TABLET | Refills: 1 | Status: ACTIVE | COMMUNITY
Start: 2021-11-17

## 2024-05-10 RX ORDER — OMEPRAZOLE 40 MG/1
TAKE ONE CAPSULE BY MOUTH TWICE DAILY CAPSULE, DELAYED RELEASE ORAL
Qty: 180 CAPSULE | Refills: 1 | Status: ACTIVE | COMMUNITY

## 2024-05-10 RX ORDER — ASPIRIN 81 MG/1
1 TABLET TABLET, COATED ORAL ONCE A DAY
Status: ACTIVE | COMMUNITY

## 2024-05-10 RX ORDER — ATENOLOL 25 MG/1
TABLET ORAL
Qty: 0 | Refills: 0 | Status: ACTIVE | COMMUNITY
Start: 1900-01-01

## 2024-05-10 RX ORDER — ESOMEPRAZOLE MAGNESIUM 20 MG/1
1 CAPSULE CAPSULE, DELAYED RELEASE ORAL TWICE A DAY
Qty: 180 CAPSULE | Refills: 1 | Status: ACTIVE | COMMUNITY

## 2024-05-10 RX ORDER — FAMOTIDINE 40 MG/1
1 TABLET TABLET, FILM COATED ORAL TWICE A DAY
Qty: 180 TABLET | Status: ACTIVE | COMMUNITY
Start: 2021-05-04

## 2024-05-10 RX ORDER — CHOLESTYRAMINE 4 G/9G
1 PACKET MIXED WITH WATER OR NON-CARBONATED DRINK POWDER, FOR SUSPENSION ORAL TWICE A DAY
Qty: 60 | Refills: 1 | Status: ACTIVE | COMMUNITY
Start: 2022-06-21

## 2024-05-10 RX ORDER — PANTOPRAZOLE SODIUM 40 MG/1
1 TABLET TABLET, DELAYED RELEASE ORAL ONCE A DAY
Qty: 90 | Refills: 1 | Status: ACTIVE | COMMUNITY
Start: 2023-11-10

## 2024-10-18 ENCOUNTER — ERX REFILL RESPONSE (OUTPATIENT)
Dept: URBAN - METROPOLITAN AREA CLINIC 82 | Facility: CLINIC | Age: 79
End: 2024-10-18

## 2024-10-18 RX ORDER — OMEPRAZOLE 40 MG/1
TAKE ONE CAPSULE BY MOUTH TWICE DAILY CAPSULE, DELAYED RELEASE ORAL
Qty: 180 CAPSULE | Refills: 1 | OUTPATIENT

## 2024-10-21 ENCOUNTER — ERX REFILL RESPONSE (OUTPATIENT)
Dept: URBAN - METROPOLITAN AREA CLINIC 82 | Facility: CLINIC | Age: 79
End: 2024-10-21

## 2024-10-21 RX ORDER — OMEPRAZOLE 40 MG/1
TAKE ONE CAPSULE BY MOUTH TWICE DAILY CAPSULE, DELAYED RELEASE ORAL
Qty: 180 CAPSULE | Refills: 1 | OUTPATIENT

## 2024-11-15 ENCOUNTER — OFFICE VISIT (OUTPATIENT)
Dept: URBAN - METROPOLITAN AREA CLINIC 82 | Facility: CLINIC | Age: 79
End: 2024-11-15

## 2024-11-27 ENCOUNTER — OFFICE VISIT (OUTPATIENT)
Dept: URBAN - METROPOLITAN AREA CLINIC 82 | Facility: CLINIC | Age: 79
End: 2024-11-27
Payer: MEDICARE

## 2024-11-27 VITALS
HEIGHT: 70 IN | TEMPERATURE: 97.9 F | DIASTOLIC BLOOD PRESSURE: 83 MMHG | BODY MASS INDEX: 28.49 KG/M2 | SYSTOLIC BLOOD PRESSURE: 158 MMHG | HEART RATE: 63 BPM | WEIGHT: 199 LBS

## 2024-11-27 DIAGNOSIS — K21.9 GERD: ICD-10-CM

## 2024-11-27 DIAGNOSIS — K58.9 IRRITABLE BOWEL SYNDROME: ICD-10-CM

## 2024-11-27 DIAGNOSIS — Z86.0100 PERSONAL HISTORY OF COLONIC POLYPS: ICD-10-CM

## 2024-11-27 DIAGNOSIS — K22.70 BARRETT'S ESOPHAGUS: ICD-10-CM

## 2024-11-27 PROCEDURE — 99214 OFFICE O/P EST MOD 30 MIN: CPT | Performed by: INTERNAL MEDICINE

## 2024-11-27 RX ORDER — CHOLESTYRAMINE 4 G/9G
1 PACKET MIXED WITH WATER OR NON-CARBONATED DRINK POWDER, FOR SUSPENSION ORAL TWICE A DAY
Qty: 60 | Refills: 1 | Status: ACTIVE | COMMUNITY
Start: 2022-06-21

## 2024-11-27 RX ORDER — FAMOTIDINE 40 MG/1
1 TABLET TABLET, FILM COATED ORAL TWICE A DAY
Qty: 180 TABLET | Status: ACTIVE | COMMUNITY
Start: 2021-05-04

## 2024-11-27 RX ORDER — FAMOTIDINE 40 MG/1
1 TABLET TABLET, FILM COATED ORAL TWICE A DAY
Qty: 180 TABLET | Refills: 1 | Status: ACTIVE | COMMUNITY
Start: 2021-11-17

## 2024-11-27 RX ORDER — ESOMEPRAZOLE MAGNESIUM 20 MG/1
1 CAPSULE CAPSULE, DELAYED RELEASE ORAL TWICE A DAY
Qty: 180 CAPSULE | Refills: 1 | Status: ACTIVE | COMMUNITY

## 2024-11-27 RX ORDER — ATENOLOL 25 MG/1
TABLET ORAL
Qty: 0 | Refills: 0 | Status: ACTIVE | COMMUNITY
Start: 1900-01-01

## 2024-11-27 RX ORDER — ASPIRIN 81 MG/1
1 TABLET TABLET, COATED ORAL ONCE A DAY
Status: ACTIVE | COMMUNITY

## 2024-11-27 RX ORDER — OMEPRAZOLE 40 MG/1
TAKE ONE CAPSULE BY MOUTH TWICE DAILY CAPSULE, DELAYED RELEASE ORAL
Qty: 180 CAPSULE | Refills: 1 | Status: ACTIVE | COMMUNITY

## 2024-11-27 RX ORDER — OMEPRAZOLE 40 MG/1
1 CAPSULE 30 MINUTES BEFORE MORNING MEAL CAPSULE, DELAYED RELEASE ORAL TWICE A DAY
Qty: 180 | Refills: 1 | OUTPATIENT
Start: 2024-11-27

## 2024-11-27 RX ORDER — PANTOPRAZOLE SODIUM 40 MG/1
1 TABLET TABLET, DELAYED RELEASE ORAL ONCE A DAY
Qty: 90 | Refills: 1 | Status: ACTIVE | COMMUNITY
Start: 2023-11-10

## 2024-11-27 NOTE — HPI-TODAY'S VISIT:
Patient came for follow-up patient says feeling much better now bloating improved abdominal pain improved he did lost weight and he is eating better now symptoms of heartburn is also under control he takes omeprazole twice a day.  He also takes a vitamin B12 supplementation denies of any melena hematochezia denies of any NSAID use denies does complain of occasional bloating occasional excessive bowel sounds he does hear.  Takes probiotics.  No loss of weight no loss of appetite no dysphagia no history of anemia.   3/15/2023 Patient presents for a follow up. Patient denies having a lot of acid reflux and he states that he has some bloating, but better since the last time. Patient states that at night, his stomach feels a bit unsettled and that tums helps. Patient denies constipation and states that only sometimes he has loose bowels. Overall, he feels a lot better. He states that he is only on omeprazole. Patient states that protonix and dexilant did not help him in the past.  11/10/2023 Patient presents for a follow up. Patient denies any new symptoms or issues since the last time he was here. He states that he is still on pantoprazole every day. Overall, juan pablo de anda states that he feels much better.  5/10/2024 Patient presents for a follow up. Patient states that he has not necessarily had any acid reflux symptoms since the last time e was here. He does state that he has some bloating and denies constipation. He denies hematochezia or melena. He is on omeprazole bid. He is still on dicyclomine at night. Patient denies dysphagia.  11/27/2024 Patient presents for a follow up. Patient says he has recently started bloating and has been taking Align and says it improved his symptoms. He says he still has diarrhea. Patient denies acid reflux and heartburn will taking omeprazole prn. Patient denies dysphagia. Patient recently saw PCP for physical, and is following with him about his kidney.

## 2025-05-21 ENCOUNTER — OFFICE VISIT (OUTPATIENT)
Dept: URBAN - METROPOLITAN AREA CLINIC 82 | Facility: CLINIC | Age: 80
End: 2025-05-21
Payer: MEDICARE

## 2025-05-21 DIAGNOSIS — Z86.0100 PERSONAL HISTORY OF COLONIC POLYPS: ICD-10-CM

## 2025-05-21 DIAGNOSIS — K21.9 GERD: ICD-10-CM

## 2025-05-21 DIAGNOSIS — K22.70 BARRETT'S ESOPHAGUS: ICD-10-CM

## 2025-05-21 DIAGNOSIS — K58.9 IRRITABLE BOWEL SYNDROME: ICD-10-CM

## 2025-05-21 PROCEDURE — 99214 OFFICE O/P EST MOD 30 MIN: CPT | Performed by: INTERNAL MEDICINE

## 2025-05-21 RX ORDER — DICYCLOMINE HYDROCHLORIDE 10 MG/1
1 TABLET CAPSULE ORAL THREE TIMES A DAY
Qty: 90 | Refills: 1 | OUTPATIENT
Start: 2025-05-21

## 2025-05-21 RX ORDER — FAMOTIDINE 40 MG/1
1 TABLET TABLET, FILM COATED ORAL TWICE A DAY
Qty: 180 TABLET | Refills: 1 | Status: ACTIVE | COMMUNITY
Start: 2021-11-17

## 2025-05-21 RX ORDER — CHOLESTYRAMINE 4 G/9G
1 PACKET MIXED WITH WATER OR NON-CARBONATED DRINK POWDER, FOR SUSPENSION ORAL TWICE A DAY
Qty: 60 | Refills: 1 | Status: ACTIVE | COMMUNITY
Start: 2022-06-21

## 2025-05-21 RX ORDER — OMEPRAZOLE 40 MG/1
TAKE ONE CAPSULE BY MOUTH TWICE DAILY CAPSULE, DELAYED RELEASE ORAL
Qty: 180 CAPSULE | Refills: 1 | Status: ACTIVE | COMMUNITY

## 2025-05-21 RX ORDER — PANTOPRAZOLE SODIUM 40 MG/1
1 TABLET TABLET, DELAYED RELEASE ORAL ONCE A DAY
Qty: 90 | Refills: 1 | Status: ACTIVE | COMMUNITY
Start: 2023-11-10

## 2025-05-21 RX ORDER — ASPIRIN 81 MG/1
1 TABLET TABLET, COATED ORAL ONCE A DAY
Status: ACTIVE | COMMUNITY

## 2025-05-21 RX ORDER — ATENOLOL 25 MG/1
TABLET ORAL
Qty: 0 | Refills: 0 | Status: ACTIVE | COMMUNITY
Start: 1900-01-01

## 2025-05-21 RX ORDER — OMEPRAZOLE 40 MG/1
1 CAPSULE 30 MINUTES BEFORE MORNING MEAL CAPSULE, DELAYED RELEASE ORAL ONCE A DAY
Qty: 90 | Refills: 3 | OUTPATIENT
Start: 2025-05-21

## 2025-05-21 RX ORDER — ESOMEPRAZOLE MAGNESIUM 20 MG/1
1 CAPSULE CAPSULE, DELAYED RELEASE ORAL TWICE A DAY
Qty: 180 CAPSULE | Refills: 1 | Status: ACTIVE | COMMUNITY

## 2025-05-21 RX ORDER — FAMOTIDINE 40 MG/1
1 TABLET TABLET, FILM COATED ORAL TWICE A DAY
Qty: 180 TABLET | Status: ACTIVE | COMMUNITY
Start: 2021-05-04

## 2025-05-21 RX ORDER — OMEPRAZOLE 40 MG/1
1 CAPSULE 30 MINUTES BEFORE MORNING MEAL CAPSULE, DELAYED RELEASE ORAL TWICE A DAY
Qty: 180 | Refills: 1 | Status: ACTIVE | COMMUNITY
Start: 2024-11-27

## 2025-05-21 NOTE — HPI-TODAY'S VISIT:
Patient came for follow-up patient says feeling much better now bloating improved abdominal pain improved he did lost weight and he is eating better now symptoms of heartburn is also under control he takes omeprazole twice a day.  He also takes a vitamin B12 supplementation denies of any melena hematochezia denies of any NSAID use denies does complain of occasional bloating occasional excessive bowel sounds he does hear.  Takes probiotics.  No loss of weight no loss of appetite no dysphagia no history of anemia.   3/15/2023 Patient presents for a follow up. Patient denies having a lot of acid reflux and he states that he has some bloating, but better since the last time. Patient states that at night, his stomach feels a bit unsettled and that tums helps. Patient denies constipation and states that only sometimes he has loose bowels. Overall, he feels a lot better. He states that he is only on omeprazole. Patient states that protonix and dexilant did not help him in the past.  11/10/2023 Patient presents for a follow up. Patient denies any new symptoms or issues since the last time he was here. He states that he is still on pantoprazole every day. Overall, juan pablo de anda states that he feels much better.  5/10/2024 Patient presents for a follow up. Patient states that he has not necessarily had any acid reflux symptoms since the last time e was here. He does state that he has some bloating and denies constipation. He denies hematochezia or melena. He is on omeprazole bid. He is still on dicyclomine at night. Patient denies dysphagia.  11/27/2024 Patient presents for a follow up. Patient says he has recently started bloating and has been taking Align and says it improved his symptoms. He says he still has diarrhea. Patient denies acid reflux and heartburn will taking omeprazole prn. Patient denies dysphagia. Patient recently saw PCP for physical, and is following with him about his kidney.  5/21/2025 Patient presents for 6 month follow up. He says he recently hurt his shoulder. He has been having bloating occasionally and denies constiaption. Patient says he has taken IBgard in the past and says it did not make much of a difference in his symptoms. He denies heartburn and acid reflux. Patient has been taking omeprazole qd in the morning and says it has been helping his symptoms. He has been following with Dr. Corona, and his recent physical was normal.

## 2025-06-23 ENCOUNTER — ERX REFILL RESPONSE (OUTPATIENT)
Dept: URBAN - METROPOLITAN AREA CLINIC 82 | Facility: CLINIC | Age: 80
End: 2025-06-23

## 2025-06-23 RX ORDER — DICYCLOMINE HYDROCHLORIDE 10 MG/1
TAKE 1 TABLET ORALLY THREE TIMES A DAY AS NEEDED CAPSULE ORAL
Qty: 270 CAPSULE | Refills: 1 | OUTPATIENT

## 2025-06-23 RX ORDER — DICYCLOMINE HYDROCHLORIDE 10 MG/1
1 TABLET CAPSULE ORAL THREE TIMES A DAY
Qty: 90 | Refills: 1 | OUTPATIENT